# Patient Record
Sex: MALE | Race: WHITE | NOT HISPANIC OR LATINO | Employment: OTHER | URBAN - METROPOLITAN AREA
[De-identification: names, ages, dates, MRNs, and addresses within clinical notes are randomized per-mention and may not be internally consistent; named-entity substitution may affect disease eponyms.]

---

## 2021-03-16 ENCOUNTER — TELEPHONE (OUTPATIENT)
Dept: SURGICAL ONCOLOGY | Facility: CLINIC | Age: 71
End: 2021-03-16

## 2021-03-16 NOTE — TELEPHONE ENCOUNTER
New Patient Encounter    New Patient Intake Form   Patient Details:  Viviana Sanchez  1950  [id number]    Background Information:  [address] starts by opening a telephone encounter and gathering the following information   Who is calling to schedule? If not self, relationship to patient? self   Referring Provider Dr Mirza Kuo patient  [phone]   What is the diagnosis? Melanoma    Is this diagnosis confirmed? Yes   When was the diagnosis? ongoing   Is there a confirmed diagnosis from a biopsy/tissue reviewed by pathology? yes   Were outside slides requested? Yes   Is patient aware of diagnosis? Yes   Is there a personal history and what kind? No   Is there a family history and what kind? No   Reason for visit? New Diagnosis   Have you had any imaging or labs done? If so: when, where? yes  [facility]   Are records in EPIC? No- unable to access records from [facility]    If patient has a prior history of breast cancer were old records obtained? NA   Was the patient told to bring a disk? No   Does the patient smoke or Vape? No   If yes, how many packs or cartridges per day? na   Scheduling Information:   Preferred [place]: Adela Ann     Are there any dates/time the patient cannot be seen? na   Miscellaneous: Records located in Care Everywhere  After completing the above information, please route to Financial Counselor and the appropriate Nurse Navigator for review

## 2021-03-22 DIAGNOSIS — C43.4 MALIGNANT MELANOMA OF SCALP (HCC): Primary | ICD-10-CM

## 2021-03-22 RX ORDER — SODIUM CHLORIDE 9 MG/ML
20 INJECTION, SOLUTION INTRAVENOUS ONCE
Status: CANCELLED | OUTPATIENT
Start: 2021-03-31

## 2021-03-25 ENCOUNTER — TELEPHONE (OUTPATIENT)
Dept: HEMATOLOGY ONCOLOGY | Facility: CLINIC | Age: 71
End: 2021-03-25

## 2021-03-25 NOTE — TELEPHONE ENCOUNTER
Attempted to call patient and introduce myself  Wanting to review our process and expections for treatment next week  VM box not set up and no scanned communication consent  If patient returns my call please forward to me in [facility]

## 2021-03-29 ENCOUNTER — DOCUMENTATION (OUTPATIENT)
Dept: HEMATOLOGY ONCOLOGY | Facility: CLINIC | Age: 71
End: 2021-03-29

## 2021-03-29 DIAGNOSIS — C43.4 MALIGNANT MELANOMA OF SCALP (HCC): Primary | ICD-10-CM

## 2021-03-31 ENCOUNTER — HOSPITAL ENCOUNTER (OUTPATIENT)
Dept: INFUSION CENTER | Facility: CLINIC | Age: 71
End: 2021-03-31

## 2021-03-31 ENCOUNTER — CONSULT (OUTPATIENT)
Dept: HEMATOLOGY ONCOLOGY | Facility: CLINIC | Age: 71
End: 2021-03-31
Payer: MEDICARE

## 2021-03-31 VITALS
RESPIRATION RATE: 18 BRPM | HEART RATE: 77 BPM | HEIGHT: 70 IN | BODY MASS INDEX: 25.48 KG/M2 | SYSTOLIC BLOOD PRESSURE: 140 MMHG | WEIGHT: 178 LBS | OXYGEN SATURATION: 100 % | TEMPERATURE: 96.5 F | DIASTOLIC BLOOD PRESSURE: 82 MMHG

## 2021-03-31 DIAGNOSIS — Z79.899 HIGH RISK MEDICATION USE: ICD-10-CM

## 2021-03-31 DIAGNOSIS — R11.0 NAUSEA: ICD-10-CM

## 2021-03-31 DIAGNOSIS — C43.4 MALIGNANT MELANOMA OF SCALP (HCC): Primary | ICD-10-CM

## 2021-03-31 PROCEDURE — 99215 OFFICE O/P EST HI 40 MIN: CPT | Performed by: INTERNAL MEDICINE

## 2021-03-31 RX ORDER — LAMOTRIGINE 25 MG/1
TABLET ORAL
COMMUNITY
Start: 2021-01-28 | End: 2021-05-06 | Stop reason: ALTCHOICE

## 2021-03-31 RX ORDER — PANTOPRAZOLE SODIUM 40 MG/1
40 TABLET, DELAYED RELEASE ORAL DAILY
COMMUNITY
Start: 2020-10-24

## 2021-03-31 RX ORDER — SUMATRIPTAN 50 MG/1
50 TABLET, FILM COATED ORAL ONCE AS NEEDED
COMMUNITY

## 2021-03-31 RX ORDER — ONDANSETRON 4 MG/1
4 TABLET, FILM COATED ORAL
COMMUNITY
Start: 2021-03-25 | End: 2021-05-06 | Stop reason: SDUPTHER

## 2021-03-31 RX ORDER — SIMVASTATIN 40 MG
40 TABLET ORAL
COMMUNITY

## 2021-03-31 RX ORDER — LEVOTHYROXINE SODIUM 0.05 MG/1
100 TABLET ORAL DAILY
COMMUNITY
Start: 2021-03-03 | End: 2021-08-12

## 2021-03-31 RX ORDER — LAMOTRIGINE 100 MG/1
50 TABLET ORAL
COMMUNITY
Start: 2021-01-28 | End: 2021-08-05 | Stop reason: ALTCHOICE

## 2021-03-31 RX ORDER — GEMFIBROZIL 600 MG/1
600 TABLET, FILM COATED ORAL
COMMUNITY
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
## 2021-04-01 PROBLEM — D86.9 SARCOIDOSIS: Status: ACTIVE | Noted: 2020-07-07

## 2021-04-01 PROBLEM — E03.9 ACQUIRED HYPOTHYROIDISM: Status: ACTIVE | Noted: 2020-07-07

## 2021-04-01 PROBLEM — R56.9 SEIZURES (HCC): Status: ACTIVE | Noted: 2018-02-06
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
## 2021-04-01 NOTE — ASSESSMENT & PLAN NOTE
Mr Jenifer Patricio is a 66 yr male with 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 pembrolizumab here for follow up  He recently received his treatment of pembrolizumab last week on 3/25/2021 at [facility]  He is tolerating treatment well  Labs reviewed and ok  TSH is improving and he is stable on his current dose of levothyroxine  I will not change any dose at this time, but will consider increasing if there are worsening symptoms or TSH increases again  He is due for imaging now  Will get a PET scan  Unable to do brain MRI due to pacemaker  If needbe, can get HCT, depending on PET results  He will return in 5 weeks for clinic visit and infusion  I will also check in with him in three weeks to see how he is doing  Scripts provided for blood work  I will request ECHO report to evaluate EF and ensure no changes and that this is not contributing to unwell and fatigue feeling  He knows to call if there are any issues or concerns prior to his next visit

## 2021-04-09 LAB
ALBUMIN SERPL-MCNC: 3.8 G/DL (ref 3.7–4.7)
ALBUMIN/GLOB SERPL: 1.1 {RATIO} (ref 1.2–2.2)
ALP SERPL-CCNC: 108 IU/L (ref 39–117)
ALT SERPL-CCNC: 12 IU/L (ref 0–44)
AST SERPL-CCNC: 16 IU/L (ref 0–40)
BASOPHILS # BLD AUTO: 0 X10E3/UL (ref 0–0.2)
BASOPHILS NFR BLD AUTO: 1 %
BILIRUB SERPL-MCNC: 0.4 MG/DL (ref 0–1.2)
BUN SERPL-MCNC: 15 MG/DL (ref 8–27)
BUN/CREAT SERPL: 14 (ref 10–24)
CALCIUM SERPL-MCNC: 9 MG/DL (ref 8.6–10.2)
CHLORIDE SERPL-SCNC: 100 MMOL/L (ref 96–106)
CO2 SERPL-SCNC: 23 MMOL/L (ref 20–29)
CREAT SERPL-MCNC: 1.08 MG/DL (ref 0.76–1.27)
EOSINOPHIL # BLD AUTO: 0 X10E3/UL (ref 0–0.4)
EOSINOPHIL NFR BLD AUTO: 0 %
ERYTHROCYTE [DISTWIDTH] IN BLOOD BY AUTOMATED COUNT: 14.6 % (ref 11.6–15.4)
GLOBULIN SER-MCNC: 3.4 G/DL (ref 1.5–4.5)
GLUCOSE SERPL-MCNC: 100 MG/DL (ref 65–99)
HCT VFR BLD AUTO: 39.3 % (ref 37.5–51)
HGB BLD-MCNC: 13.1 G/DL (ref 13–17.7)
IMM GRANULOCYTES # BLD: 0 X10E3/UL (ref 0–0.1)
IMM GRANULOCYTES NFR BLD: 0 %
LDH SERPL-CCNC: 172 IU/L (ref 121–224)
LYMPHOCYTES # BLD AUTO: 0.6 X10E3/UL (ref 0.7–3.1)
LYMPHOCYTES NFR BLD AUTO: 11 %
MCH RBC QN AUTO: 28.4 PG (ref 26.6–33)
MCHC RBC AUTO-ENTMCNC: 33.3 G/DL (ref 31.5–35.7)
MCV RBC AUTO: 85 FL (ref 79–97)
MONOCYTES # BLD AUTO: 0.8 X10E3/UL (ref 0.1–0.9)
MONOCYTES NFR BLD AUTO: 16 %
NEUTROPHILS # BLD AUTO: 3.6 X10E3/UL (ref 1.4–7)
NEUTROPHILS NFR BLD AUTO: 72 %
PLATELET # BLD AUTO: 205 X10E3/UL (ref 150–450)
POTASSIUM SERPL-SCNC: 4.3 MMOL/L (ref 3.5–5.2)
PROT SERPL-MCNC: 7.2 G/DL (ref 6–8.5)
RBC # BLD AUTO: 4.62 X10E6/UL (ref 4.14–5.8)
SL AMB EGFR AFRICAN AMERICAN: 78 ML/MIN/1.73
SL AMB EGFR NON AFRICAN AMERICAN: 68 ML/MIN/1.73
SL AMB T4, FREE (DIRECT): 1.31 NG/DL (ref 0.82–1.77)
SODIUM SERPL-SCNC: 136 MMOL/L (ref 134–144)
T3FREE SERPL-MCNC: 2.4 PG/ML (ref 2–4.4)
TSH SERPL DL<=0.005 MIU/L-ACNC: 7.58 UIU/ML (ref 0.45–4.5)
WBC # BLD AUTO: 5 X10E3/UL (ref 3.4–10.8)

## 2021-04-13 ENCOUNTER — HOSPITAL ENCOUNTER (OUTPATIENT)
Dept: RADIOLOGY | Age: 71
Discharge: HOME | End: 2021-04-13
Payer: MEDICARE

## 2021-04-13 DIAGNOSIS — C43.4 MALIGNANT MELANOMA OF SCALP (HCC): ICD-10-CM

## 2021-04-13 LAB — GLUCOSE SERPL-MCNC: 104 MG/DL (ref 65–140)

## 2021-04-13 PROCEDURE — A9552 F18 FDG: HCPCS

## 2021-04-13 PROCEDURE — G1004 CDSM NDSC: HCPCS

## 2021-04-13 PROCEDURE — 82948 REAGENT STRIP/BLOOD GLUCOSE: CPT

## 2021-04-13 PROCEDURE — 78816 PET IMAGE W/CT FULL BODY: CPT

## 2021-04-16 DIAGNOSIS — C43.4 MALIGNANT MELANOMA OF SCALP (HCC): Primary | ICD-10-CM

## 2021-04-16 NOTE — PROGRESS NOTES
PET CT reviewed after request for lymph node biopsy received  There are multiple mediastinal lymph nodes which can be biopsies either via bronchoscopy or endoscopy  The easiest to reach are probably the subcarinal nodes which can be sampled from with either technique  However, there are no lymph nodes that can be safely sampled percutaneously

## 2021-04-29 DIAGNOSIS — C43.4 MALIGNANT MELANOMA OF SCALP (HCC): Primary | ICD-10-CM

## 2021-04-29 LAB
ALBUMIN SERPL-MCNC: 4.2 G/DL (ref 3.7–4.7)
ALBUMIN/GLOB SERPL: 1.1 {RATIO} (ref 1.2–2.2)
ALP SERPL-CCNC: 118 IU/L (ref 39–117)
ALT SERPL-CCNC: 13 IU/L (ref 0–44)
AST SERPL-CCNC: 17 IU/L (ref 0–40)
BASOPHILS # BLD AUTO: 0.1 X10E3/UL (ref 0–0.2)
BASOPHILS NFR BLD AUTO: 1 %
BILIRUB SERPL-MCNC: 0.5 MG/DL (ref 0–1.2)
BUN SERPL-MCNC: 19 MG/DL (ref 8–27)
BUN/CREAT SERPL: 16 (ref 10–24)
CALCIUM SERPL-MCNC: 9.6 MG/DL (ref 8.6–10.2)
CHLORIDE SERPL-SCNC: 97 MMOL/L (ref 96–106)
CO2 SERPL-SCNC: 23 MMOL/L (ref 20–29)
CREAT SERPL-MCNC: 1.22 MG/DL (ref 0.76–1.27)
EOSINOPHIL # BLD AUTO: 0.1 X10E3/UL (ref 0–0.4)
EOSINOPHIL NFR BLD AUTO: 2 %
ERYTHROCYTE [DISTWIDTH] IN BLOOD BY AUTOMATED COUNT: 14.2 % (ref 11.6–15.4)
GLOBULIN SER-MCNC: 3.8 G/DL (ref 1.5–4.5)
GLUCOSE SERPL-MCNC: 104 MG/DL (ref 65–99)
HCT VFR BLD AUTO: 42.5 % (ref 37.5–51)
HGB BLD-MCNC: 14.4 G/DL (ref 13–17.7)
IMM GRANULOCYTES # BLD: 0 X10E3/UL (ref 0–0.1)
IMM GRANULOCYTES NFR BLD: 0 %
LDH SERPL-CCNC: 141 IU/L (ref 121–224)
LYMPHOCYTES # BLD AUTO: 0.8 X10E3/UL (ref 0.7–3.1)
LYMPHOCYTES NFR BLD AUTO: 14 %
MCH RBC QN AUTO: 28.7 PG (ref 26.6–33)
MCHC RBC AUTO-ENTMCNC: 33.9 G/DL (ref 31.5–35.7)
MCV RBC AUTO: 85 FL (ref 79–97)
MONOCYTES # BLD AUTO: 1 X10E3/UL (ref 0.1–0.9)
MONOCYTES NFR BLD AUTO: 18 %
NEUTROPHILS # BLD AUTO: 3.7 X10E3/UL (ref 1.4–7)
NEUTROPHILS NFR BLD AUTO: 65 %
PLATELET # BLD AUTO: 251 X10E3/UL (ref 150–450)
POTASSIUM SERPL-SCNC: 4.6 MMOL/L (ref 3.5–5.2)
PROT SERPL-MCNC: 8 G/DL (ref 6–8.5)
RBC # BLD AUTO: 5.01 X10E6/UL (ref 4.14–5.8)
SL AMB EGFR AFRICAN AMERICAN: 68 ML/MIN/1.73
SL AMB EGFR NON AFRICAN AMERICAN: 58 ML/MIN/1.73
SL AMB T4, FREE (DIRECT): 1.08 NG/DL (ref 0.82–1.77)
SODIUM SERPL-SCNC: 132 MMOL/L (ref 134–144)
T3FREE SERPL-MCNC: 2.1 PG/ML (ref 2–4.4)
TSH SERPL DL<=0.005 MIU/L-ACNC: 11.3 UIU/ML (ref 0.45–4.5)
WBC # BLD AUTO: 5.7 X10E3/UL (ref 3.4–10.8)

## 2021-04-29 RX ORDER — SODIUM CHLORIDE 9 MG/ML
20 INJECTION, SOLUTION INTRAVENOUS ONCE
Status: CANCELLED | OUTPATIENT
Start: 2021-05-06

## 2021-05-06 ENCOUNTER — HOSPITAL ENCOUNTER (OUTPATIENT)
Dept: INFUSION CENTER | Facility: CLINIC | Age: 71
Discharge: HOME | End: 2021-05-06
Payer: MEDICARE

## 2021-05-06 ENCOUNTER — OFFICE VISIT (OUTPATIENT)
Dept: HEMATOLOGY ONCOLOGY | Facility: CLINIC | Age: 71
End: 2021-05-06
Payer: MEDICARE

## 2021-05-06 VITALS
DIASTOLIC BLOOD PRESSURE: 64 MMHG | TEMPERATURE: 97.8 F | HEART RATE: 76 BPM | SYSTOLIC BLOOD PRESSURE: 118 MMHG | OXYGEN SATURATION: 98 % | RESPIRATION RATE: 14 BRPM | WEIGHT: 179 LBS | HEIGHT: 70 IN | BODY MASS INDEX: 25.62 KG/M2

## 2021-05-06 VITALS
OXYGEN SATURATION: 98 % | TEMPERATURE: 97.3 F | SYSTOLIC BLOOD PRESSURE: 112 MMHG | RESPIRATION RATE: 16 BRPM | WEIGHT: 168 LBS | HEIGHT: 69 IN | HEART RATE: 72 BPM | DIASTOLIC BLOOD PRESSURE: 70 MMHG | BODY MASS INDEX: 24.88 KG/M2

## 2021-05-06 DIAGNOSIS — Z79.899 HIGH RISK MEDICATION USE: ICD-10-CM

## 2021-05-06 DIAGNOSIS — R11.0 NAUSEA: ICD-10-CM

## 2021-05-06 DIAGNOSIS — C43.4 MALIGNANT MELANOMA OF SCALP (HCC): Primary | ICD-10-CM

## 2021-05-06 DIAGNOSIS — E03.9 ACQUIRED HYPOTHYROIDISM: ICD-10-CM

## 2021-05-06 PROCEDURE — 99214 OFFICE O/P EST MOD 30 MIN: CPT | Performed by: INTERNAL MEDICINE

## 2021-05-06 PROCEDURE — 96413 CHEMO IV INFUSION 1 HR: CPT

## 2021-05-06 RX ORDER — LEVOTHYROXINE SODIUM 0.12 MG/1
125 TABLET ORAL DAILY
Qty: 30 TABLET | Refills: 5 | Status: CANCELLED | OUTPATIENT
Start: 2021-05-06

## 2021-05-06 RX ORDER — SODIUM CHLORIDE 9 MG/ML
20 INJECTION, SOLUTION INTRAVENOUS ONCE
Status: COMPLETED | OUTPATIENT
Start: 2021-05-06 | End: 2021-05-06

## 2021-05-06 RX ORDER — ONDANSETRON HYDROCHLORIDE 8 MG/1
4 TABLET, FILM COATED ORAL EVERY 8 HOURS PRN
Qty: 20 TABLET | Refills: 1 | OUTPATIENT
Start: 2021-05-06

## 2021-05-06 RX ORDER — ONDANSETRON 4 MG/1
4 TABLET, FILM COATED ORAL EVERY 8 HOURS PRN
Qty: 20 TABLET | Refills: 5 | Status: CANCELLED | OUTPATIENT
Start: 2021-05-06 | End: 2021-05-21

## 2021-05-06 RX ORDER — ONDANSETRON 4 MG/1
4 TABLET, FILM COATED ORAL EVERY 8 HOURS PRN
Qty: 20 TABLET | Refills: 5 | Status: SHIPPED | OUTPATIENT
Start: 2021-05-06 | End: 2021-07-15 | Stop reason: SDUPTHER

## 2021-05-06 RX ORDER — BETAMETHASONE DIPROPIONATE 0.5 MG/G
CREAM TOPICAL 2 TIMES DAILY
COMMUNITY

## 2021-05-06 RX ORDER — LEVOTHYROXINE SODIUM 0.12 MG/1
125 TABLET ORAL DAILY
Qty: 30 TABLET | Refills: 5 | Status: SHIPPED | OUTPATIENT
Start: 2021-05-06

## 2021-05-06 RX ORDER — BENZONATATE 100 MG/1
100 CAPSULE ORAL 3 TIMES DAILY PRN
COMMUNITY

## 2021-05-06 RX ADMIN — SODIUM CHLORIDE 20 ML/HR: 0.9 INJECTION, SOLUTION INTRAVENOUS at 12:55

## 2021-05-06 RX ADMIN — SODIUM CHLORIDE 400 MG: 9 INJECTION, SOLUTION INTRAVENOUS at 13:20

## 2021-05-06 NOTE — ASSESSMENT & PLAN NOTE
Mr Jeannette Monet is a 71yr male with 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 treatment with pembrolizumab here for follow up and treatment  He is doing well  He has a chronic dry cough that he is using Tessalon perles for, as prescribed by PCP  O2 stats ok and no SOB  Will continue to monitor, and can get Chest CT if increased concern for pneumonitis  Has some localized skin itching on upper legs/thighs that he is using topical steroids, as hydroxyzine makes him too sleepy and this is ok to continue  Healing scab on scalp from bumping it one week ago  Monitor healing, and if appears to not heal, recommend biopsy, as this is site of primary and recurrent melanoma  Labs reviewed and ok to treat  Cycle #4 pembrolizumab today  Due for repeat scan at end of June  Will give him script at his next visit  He will return in 6 weeks with labs (scripts provided) for his next treatment  He knows to call with any issues or concerns prior to his next visit

## 2021-05-06 NOTE — LETTER
May 6, 2021     Lyn Coburn MD  [address] [facility]  [address] [facility]    Patient: Alison Cormier   YOB: 1950   Date of Visit: 5/6/2021       Dear Dr Susana Oconnor: Thank you for referring Ariana Espino to me for evaluation  Below are my notes for this consultation  If you have questions, please do not hesitate to call me  I look forward to following your patient along with you  Sincerely,        Liza White MD        CC: MD Bryson Sampson MD Nikki Asp, MD  5/6/2021  5:23 PM  Sign when Signing Visit  [address] [place], [place] [address]  [phone]     Date of Visit: 5/6/2021  Name: Alison Cormier   YOB: 1950     Subjective        VISIT DIAGNOSIS:  Diagnoses and all orders for this visit:    Malignant melanoma of scalp ([facility] )    Acquired hypothyroidism    High risk medication use    Other orders  -     benzonatate (TESSALON PERLES) 100 mg capsule; Take 100 mg by mouth 3 (three) times a day as needed for cough  -     betamethasone dipropionate (DIPROSONE) 0 05 % cream; Apply topically 2 (two) times a day        Oncology History   Malignant melanoma of scalp ([facility] )   5/15/2020 Initial Diagnosis    Malignant melanoma of scalp ([facility] )     6/15/2020 -  Cancer Staged    Staging form: Melanoma of the Skin, AJCC 8th Edition  - Clinical stage from 6/15/2020: Stage IIB (cT3b, cN0, cM0) - Signed by Liza White MD on 3/22/2021       10/26/2020 -  Cancer Staged    Staging form: Melanoma of the Skin, AJCC 8th Edition  - Pathologic stage from 10/26/2020: Stage IIIC (pT3b, pN1c, cM0) - Signed by Liza White MD on 3/22/2021       11/2/2020 - 11/2/2020 Radiation    Performed at location closer to home    Radiation to scalp         12/28/2020 -  Chemotherapy    pembrolizumab (KEYTRUDA) 400 mg in sodium chloride 0 9 % 50 mL IVPB, 400 mg (200 % of original dose 200 mg), Intravenous, Once, 4 of 9 cycles  Dose modification: 400 mg (original dose 200 mg, Cycle 1, Reason: Other (See Comments), Comment: new 6 week dosing)  Administration: 400 mg (5/6/2021)        Cancer Staging  Malignant melanoma of scalp ([facility] [address] )  Staging form: Melanoma of the Skin, AJCC 8th Edition  - Clinical stage from 6/15/2020: Stage IIB (cT3b, cN0, cM0) - Signed by Ernie Holden MD on 3/22/2021  - Pathologic stage from 10/26/2020: Stage IIIC (pT3b, pN1c, cM0) - Signed by Ernie Holden MD on 3/22/2021     Treatment Details   Treatment goal Curative   Plan Name OP Pembrolizumab Q 42 Days   Status Active   Start Date 12/28/2020   End Date 12/2/2021 (Planned)   Provider Ernie Holden MD   Chemotherapy pembrolizumab [facility]) 400 mg in sodium chloride 0 9 % 50 mL IVPB, 400 mg (200 % of original dose 200 mg), Intravenous, Once, 4 of 9 cycles  Dose modification: 400 mg (original dose 200 mg, Cycle 1, Reason: Other (See Comments), Comment: new 6 week dosing)  Administration: 400 mg (5/6/2021)          HISTORY OF PRESENT ILLNESS: Ameya Toledo is a 66 y o  male  who has Stage IIIC melanoma and is receiving adjuvant pembrolizumab is seen today for follow up and treatment with pembrolizumab  He is dong ok and feels well  After his last visit, he has a PET scan for surveillance and results demonstrated increase in size and FDG activity of multiple LNs, diffusely through his body  He had received his  COVID vaccine a few days prior to imaging  We will re-image him approximately 8 weeks from that scan to ensure it was due to [place] and not disease progression, but these results are consistent with generalized immune response/activation, known to be caused by the [place] vaccination  We had discussed this over the phone once I had obtained the results  He is feeling a bit tired, but thinks it is due to his lamictal, as it is causing a number of side effects for him     He has just started to taper the medication  He is having a continued dry cough, that is minimal, though he has episodes where he coughs a lot  Not productive  He denies SOB  Nothing improves his cough and nothing makes it worse specifically  His PCP prescribed tessalon perles  He has had some itching and intermittent rash on legs/upper thighs that I had previously prescribed him hydroxyzine  However, this makes him very tired, so he is using topical steroids which he can continue  He also states that he bumped the top of his head a week ago, and now has a scab in the area  No pain, bleeding or itching of the lesion  REVIEW OF SYSTEMS:  Review of Systems   Constitutional: Positive for appetite change and fatigue  Negative for fever and unexpected weight change  HENT:   Negative for lump/mass  Eyes: Negative for icterus  Respiratory: Positive for cough  Negative for shortness of breath and wheezing  Cardiovascular: Negative for leg swelling  Gastrointestinal: Positive for constipation  Negative for abdominal pain, diarrhea, nausea and vomiting  Genitourinary: Negative for difficulty urinating and hematuria  Musculoskeletal: Negative for arthralgias, gait problem and myalgias  Skin: Positive for itching  Negative for rash  No new, changing, or concerning lesions  Neurological: Negative for extremity weakness, gait problem, headaches, light-headedness and numbness  Hematological: Negative for adenopathy          MEDICATIONS:    Current Outpatient Medications:     Aspirin Buf,CaCarb-MgCarb-MgO, 81 MG TABS, Take 81 mg by mouth, Disp: , Rfl:     benzonatate (TESSALON PERLES) 100 mg capsule, Take 100 mg by mouth 3 (three) times a day as needed for cough, Disp: , Rfl:     betamethasone dipropionate (DIPROSONE) 0 05 % cream, Apply topically 2 (two) times a day, Disp: , Rfl:     gemfibrozil (LOPID) 600 mg tablet, Take 600 mg by mouth, Disp: , Rfl:     lamoTRIgine (LaMICtal) 100 mg tablet, Take 50 mg by mouth Currently tapering off of this medication  , Disp: , Rfl:     levothyroxine 50 mcg tablet, Take 100 mcg by mouth daily, Disp: , Rfl:     pantoprazole (PROTONIX) 40 mg tablet, Take 40 mg by mouth daily, Disp: , Rfl:     simvastatin (ZOCOR) 40 mg tablet, Take 40 mg by mouth, Disp: , Rfl:     SUMAtriptan (IMITREX) 50 mg tablet, Take 50 mg by mouth, Disp: , Rfl:     levothyroxine 125 mcg tablet, Take 1 tablet (125 mcg total) by mouth daily, Disp: 30 tablet, Rfl: 5    ondansetron (ZOFRAN) 4 mg tablet, Take 1 tablet (4 mg total) by mouth every 8 (eight) hours as needed for nausea or vomiting for up to 15 days, Disp: 20 tablet, Rfl: 5  No current facility-administered medications for this visit        ALLERGIES:  Allergies   Allergen Reactions    Chocolate - Food Allergy Other (See Comments)    Iodinated Diagnostic Agents Hives     CT contrast dye      Penicillins Hives    Sulfa Antibiotics Hives    Banana - Food Allergy Headache        ACTIVE PROBLEMS:  Patient Active Problem List   Diagnosis    [facility]    Acquired hypothyroidism    Coronary arteriosclerosis    RBBB    Sarcoidosis    Seizures ([facility] )          PAST MEDICAL HISTORY:   Past Medical History:   Diagnosis Date    Migraines     [facility] cancer ([facility] )     Sarcoidosis     Skin cancer         PAST SURGICAL HISTORY:  Past Surgical History:   Procedure Laterality Date    HERNIA REPAIR      PROSTATE SURGERY          SOCIAL HISTORY:  Social History     Socioeconomic History    Marital status: /Civil Union     Spouse name: None    Number of children: None    Years of education: None    Highest education level: None   Occupational History    None   Social Needs    Financial resource strain: None    Food insecurity     Worry: None     Inability: None    Transportation needs     Medical: None     Non-medical: None   Tobacco Use    Smoking status: Former Smoker    Smokeless tobacco: Never Used Substance and Sexual Activity    Alcohol use: Not Currently    Drug use: Never    Sexual activity: None   Lifestyle    Physical activity     Days per week: None     Minutes per session: None    Stress: None   Relationships    Social connections     Talks on phone: None     Gets together: None     Attends Sabianism service: None     Active member of club or organization: None     Attends meetings of clubs or organizations: None     Relationship status: None    Intimate partner violence     Fear of current or ex partner: None     Emotionally abused: None     Physically abused: None     Forced sexual activity: None   Other Topics Concern    None   Social History Narrative    None        FAMILY HISTORY:  History reviewed  No pertinent family history  Objective        PHYSICAL EXAMINATION:   Blood pressure 118/64, pulse 76, temperature 97 8 °F (36 6 °C), temperature source Temporal, resp  rate 14, height 5' 10" (1 778 m), weight 81 2 kg (179 lb), SpO2 98 %  Pain Score: 0-No pain     ECOG Performance Status      Most Recent Value   ECOG Performance Status  Restricted in physically strenuous activity but ambulatory and able to carry out work of a light or sedentary nature, e g , light house work, office work        ECOG PS 1       Physical Exam  Constitutional:       General: He is not in acute distress  Appearance: Normal appearance  He is not toxic-appearing  HENT:      Mouth/Throat:      Mouth: Mucous membranes are moist       Pharynx: Oropharynx is clear  Eyes:      General: No scleral icterus  Cardiovascular:      Rate and Rhythm: Normal rate and regular rhythm  Pulses: Normal pulses  Heart sounds: No murmur  No friction rub  No gallop  Pulmonary:      Effort: Pulmonary effort is normal  No respiratory distress  Breath sounds: Normal breath sounds  No wheezing or rales  Abdominal:      General: There is no distension  Palpations: There is no mass        Tenderness: There is no abdominal tenderness  There is no rebound  Musculoskeletal:         General: No swelling or tenderness  Right lower leg: No edema  Left lower leg: No edema  Lymphadenopathy:      Head:      Right side of head: No submandibular, preauricular or posterior auricular adenopathy  Left side of head: No submandibular, preauricular or posterior auricular adenopathy  Cervical: No cervical adenopathy  Right cervical: No superficial or posterior cervical adenopathy  Left cervical: No superficial or posterior cervical adenopathy  Upper Body:      Right upper body: No supraclavicular or axillary adenopathy  Left upper body: No supraclavicular or axillary adenopathy  Lower Body: No right inguinal adenopathy  No left inguinal adenopathy  Skin:     Findings: Lesion (scab on top of head) present  No rash  Comments: Well healed surgical scar  No evidence of recurrence at primary site  Healing scab in the anterior field  No associated masses or nodules and no lesions concerning for melanoma recurrence  Neurological:      General: No focal deficit present  Mental Status: He is alert and oriented to person, place, and time  Psychiatric:         Mood and Affect: Mood normal          Behavior: Behavior normal          Thought Content: Thought content normal          Judgment: Judgment normal          I reviewed lab data in the chart      White Blood Cell Count   Date Value Ref Range Status   04/28/2021 5 7 3 4 - 10 8 x10E3/uL Final   04/08/2021 5 0 3 4 - 10 8 x10E3/uL Final     Hemoglobin   Date Value Ref Range Status   04/28/2021 14 4 13 0 - 17 7 g/dL Final   04/08/2021 13 1 13 0 - 17 7 g/dL Final     Platelet Count   Date Value Ref Range Status   04/28/2021 251 150 - 450 x10E3/uL Final   04/08/2021 205 150 - 450 x10E3/uL Final     MCV   Date Value Ref Range Status   04/28/2021 85 79 - 97 fL Final   04/08/2021 85 79 - 97 fL Final      Potassium   Date Value Ref Range Status   04/28/2021 4 6 3 5 - 5 2 mmol/L Final   04/08/2021 4 3 3 5 - 5 2 mmol/L Final     Chloride   Date Value Ref Range Status   04/28/2021 97 96 - 106 mmol/L Final   04/08/2021 100 96 - 106 mmol/L Final     CO2   Date Value Ref Range Status   04/28/2021 23 20 - 29 mmol/L Final   04/08/2021 23 20 - 29 mmol/L Final     BUN   Date Value Ref Range Status   04/28/2021 19 8 - 27 mg/dL Final   04/08/2021 15 8 - 27 mg/dL Final     Creatinine   Date Value Ref Range Status   04/28/2021 1 22 0 76 - 1 27 mg/dL Final   04/08/2021 1 08 0 76 - 1 27 mg/dL Final     Glucose, Random   Date Value Ref Range Status   04/28/2021 104 (H) 65 - 99 mg/dL Final   04/08/2021 100 (H) 65 - 99 mg/dL Final     eGFR    Date Value Ref Range Status   04/28/2021 68 >59 mL/min/1 73 Final     Comment:     **[facility] currently reports eGFR in compliance with the current**    recommendations of the [place] will  update   reporting as new guidelines are published from the NKF-ASN  Task force       04/08/2021 78 >59 mL/min/1 73 Final     Albumin   Date Value Ref Range Status   04/28/2021 4 2 3 7 - 4 7 g/dL Final   04/08/2021 3 8 3 7 - 4 7 g/dL Final     TOTAL BILIRUBIN   Date Value Ref Range Status   04/28/2021 0 5 0 0 - 1 2 mg/dL Final   04/08/2021 0 4 0 0 - 1 2 mg/dL Final     AST   Date Value Ref Range Status   04/28/2021 17 0 - 40 IU/L Final   04/08/2021 16 0 - 40 IU/L Final     ALT   Date Value Ref Range Status   04/28/2021 13 0 - 44 IU/L Final   04/08/2021 12 0 - 44 IU/L Final      LDH   Date Value Ref Range Status   04/28/2021 141 121 - 224 IU/L Final   04/08/2021 172 121 - 224 IU/L Final     TSH   Date Value Ref Range Status   04/28/2021 11 300 (H) 0 450 - 4 500 uIU/mL Final   04/08/2021 7 580 (H) 0 450 - 4 500 uIU/mL Final     No results found for: Z1QLCJK   No results found for: FREET4      RECENT IMAGING:  Procedure: Nm Pet Ct Tumor Imaging Whole Body    Result Date: 4/13/2021  Narrative: WHOLE-BODY PET/CT SCAN INDICATION: Restaging of malignant melanoma of the scalp  Scalp excision 7/9/2020  Additional history of prostate cancer in 2006  C43 4: Malignant melanoma of scalp and neck MODIFIER: PS COMPARISON: Outside PET/CT 11/9/2020 CELL TYPE:  Ulcerated nodular malignant melanoma, left posterior scalp 6/15/2020 TECHNIQUE:   8 3 mCi F-18-FD administered IV  Multiplanar attenuation corrected and non attenuation corrected PET images were acquired 60 minutes post injection  Contiguous, low dose, axial CT sections were obtained from the skull vertex through the feet  Intravenous contrast material was not utilized  This examination, like all CT scans performed in the [facility], was performed utilizing techniques to minimize radiation dose exposure, including the use of iterative reconstruction  and automated exposure control  Fasting serum glucose: 104 mg/dl FINDINGS: VISUALIZED BRAIN:   No acute abnormalities are seen  HEAD/NECK:   No suspicious FDG uptake at the scalp  Diffuse radiotracer uptake at the thyroid gland, SUV max of 5 7  New small FDG avid lymph nodes in the right lower neck identified  A right supraclavicular lymph node demonstrates a SUV max of 1 8  A thin lymph node measures up to 8 mm in size image 85 series 2  A right subclavicular lymph node demonstrates a SUV max of 2 2  This measures 1 cm in size image 91 series 2  CT images: No additional significant findings  CHEST:   Multiple new FDG avid lymph nodes in the mediastinal and bilateral perihilar regions  A right perihilar focus demonstrates SUV max of 12 3  A left perihilar focus demonstrates SUV max of 6 5  Subcarinal lymph node demonstrates SUV max of 24 6  This measures up to 1 4 cm short axis image 113 series 2, new from the prior exam   Right precarinal lymph node demonstrates SUV max of 6 6  This measures 1 cm in size image 105 series 2 also new   Nodular interstitial changes and fibrosis in the lung fields bilaterally with patchy FDG uptake most extensive in the lower lobes, SUV max of 2 3 in the right lower lobe and 2 1 in the left lower lobe  CT findings are similar to the prior outside exam  CT images: Moderate coronary artery calcifications  Small hiatal hernia  ABDOMEN:   Scattered new FDG avid lymph nodes in the gastrohepatic region and aleksandar hepatis  A gastrohepatic lymph node demonstrates SUV max of 6 4  This measures 1 9 x 0 7 cm image 145 series 2  Portacaval lymph node demonstrates SUV max of 8 2  This measures 1 9 x 1 0 cm image 147 series 2  CT images: No additional significant findings  PELVIS: No FDG avid soft tissue lesions are seen  CT images: Status post prostatectomy OSSEOUS STRUCTURES/EXTREMITIES: No FDG avid lesions are seen  CT images: No significant findings  Impression: 1  Multiple new FDG avid lymph nodes in the lower neck, chest and abdomen for which metastasis should be excluded  Patient is noted to have a history of sarcoidosis which could also explain these findings along with the pulmonary nodular interstitial changes  Tissue sampling may be warranted for definitive confirmation  2  Diffuse thyroid gland activity, could be related to thyroiditis  Workstation performed: [id number]          Assessment    Assessment/Plan  Mr Dwaine Francois is a 66 yr male with 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 treatment with pembrolizumab here for follow up and treatment  Acquired hypothyroidism  Mr Mike Garza TSH continues to rise  He does state he is more tired,but thinks this is due to lamictal, which he just began to titrate  However, his TSH was elevated at his last visit and continues to increase  We will increase his levothyroxine to 125 mcg  He knows the signs and symptoms to look for regarding hyperthyroidism  We will recheck labs in 6 weeks prior to his next treatment, as it takes some time for the body to respond to changes in levothyroxine dosing    He knows to call if there are issues or concerns  Malignant melanoma of scalp [facility])  Mr Sara Mittal is a 71yr male with 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 treatment with pembrolizumab here for follow up and treatment  He is doing well  He has a chronic dry cough that he is using Tessalon perles for, as prescribed by PCP  O2 stats ok and no SOB  Will continue to monitor, and can get Chest CT if increased concern for pneumonitis  Has some localized skin itching on upper legs/thighs that he is using topical steroids, as hydroxyzine makes him too sleepy and this is ok to continue  Healing scab on scalp from bumping it one week ago  Monitor healing, and if appears to not heal, recommend biopsy, as this is site of primary and recurrent melanoma  Labs reviewed and ok to treat  Cycle #4 pembrolizumab today  Due for repeat scan at end of June  Will give him script at his next visit  He will return in 6 weeks with labs (scripts provided) for his next treatment  He knows to call with any issues or concerns prior to his next visit  Return in about 6 months (around 11/6/2021), or office visit and infusion visit       Jarred Mc MD, PhD

## 2021-05-06 NOTE — ASSESSMENT & PLAN NOTE
Mr Jumana Kelley TSH continues to rise  He does state he is more tired,but thinks this is due to lamictal, which he just began to titrate  However, his TSH was elevated at his last visit and continues to increase  We will increase his levothyroxine to 125 mcg  He knows the signs and symptoms to look for regarding hyperthyroidism  We will recheck labs in 6 weeks prior to his next treatment, as it takes some time for the body to respond to changes in levothyroxine dosing  He knows to call if there are issues or concerns

## 2021-05-06 NOTE — PROGRESS NOTES
[facility]ATOLOGY ONCOLOGY SPECIAL[facility] [place]  [address] [facility] [id number] [phone]     Date of Visit: 5/6/2021  Name: Sammi Victor   YOB: 1950     Subjective        VISIT DIAGNOSIS:  Diagnoses and all orders for this visit:    Malignant melanoma of scalp ([address] )    Acquired hypothyroidism    High risk medication use    Other orders  -     benzonatate (TESSALON PERLES) 100 mg capsule; Take 100 mg by mouth 3 (three) times a day as needed for cough  -     betamethasone dipropionate (DIPROSONE) 0 05 % cream; Apply topically 2 (two) times a day        Oncology History   Malignant melanoma of scalp ([address] )   5/15/2020 Initial Diagnosis    Malignant melanoma of scalp ([facility] )     6/15/2020 -  Cancer Staged    Staging form: Melanoma of the Skin, AJCC 8th Edition  - Clinical stage from 6/15/2020: Stage IIB (cT3b, cN0, cM0) - Signed by Yady Forde MD on 3/22/2021       10/26/2020 -  Cancer Staged    Staging form: Melanoma of the Skin, AJCC 8th Edition  - Pathologic stage from 10/26/2020: Stage IIIC (pT3b, pN1c, cM0) - Signed by Yady Forde MD on 3/22/2021       11/2/2020 - 11/2/2020 Radiation    Performed at location closer to home    Radiation to scalp         12/28/2020 -  Chemotherapy    pembrolizumab (KEYTRUDA) 400 mg in sodium chloride 0 9 % 50 mL IVPB, 400 mg (200 % of original dose 200 mg), Intravenous, Once, 4 of 9 cycles  Dose modification: 400 mg (original dose 200 mg, Cycle 1, Reason: Other (See Comments), Comment: new 6 week dosing)  Administration: 400 mg (5/6/2021)        Cancer Staging  Malignant melanoma of scalp ([facility] )  Staging form: Melanoma of the Skin, AJCC 8th Edition  - Clinical stage from 6/15/2020: Stage IIB (cT3b, cN0, cM0) - Signed by Yady Forde MD on 3/22/2021  - Pathologic stage from 10/26/2020: Stage IIIC (pT3b, pN1c, cM0) - Signed by Yady Forde MD on 3/22/2021     Treatment Details   Treatment goal Curative   Plan Name OP Pembrolizumab Q 42 Days   Status Active   Start Date 12/28/2020   End Date 12/2/2021 (Planned)   Provider Romana Aldridge MD   Chemotherapy pembrolizumab [facility]) 400 mg in sodium chloride 0 9 % 50 mL IVPB, 400 mg (200 % of original dose 200 mg), Intravenous, Once, 4 of 9 cycles  Dose modification: 400 mg (original dose 200 mg, Cycle 1, Reason: Other (See Comments), Comment: new 6 week dosing)  Administration: 400 mg (5/6/2021)          HISTORY OF PRESENT ILLNESS: Pia Wade is a 66 y o  male  who has Stage IIIC melanoma and is receiving adjuvant pembrolizumab is seen today for follow up and treatment with pembrolizumab  He is dong ok and feels well  After his last visit, he has a PET scan for surveillance and results demonstrated increase in size and FDG activity of multiple LNs, diffusely through his body  He had received his  COVID vaccine a few days prior to imaging  We will re-image him approximately 8 weeks from that scan to ensure it was due to [place] and not disease progression, but these results are consistent with generalized immune response/activation, known to be caused by the [place] vaccination  We had discussed this over the phone once I had obtained the results  He is feeling a bit tired, but thinks it is due to his lamictal, as it is causing a number of side effects for him  He has just started to taper the medication  He is having a continued dry cough, that is minimal, though he has episodes where he coughs a lot  Not productive  He denies SOB  Nothing improves his cough and nothing makes it worse specifically  His PCP prescribed tessalon perles  He has had some itching and intermittent rash on legs/upper thighs that I had previously prescribed him hydroxyzine  However, this makes him very tired, so he is using topical steroids which he can continue  He also states that he bumped the top of his head a week ago, and now has a scab in the area    No pain, bleeding or itching of the lesion  REVIEW OF SYSTEMS:  Review of Systems   Constitutional: Positive for appetite change and fatigue  Negative for fever and unexpected weight change  HENT:   Negative for lump/mass  Eyes: Negative for icterus  Respiratory: Positive for cough  Negative for shortness of breath and wheezing  Cardiovascular: Negative for leg swelling  Gastrointestinal: Positive for constipation  Negative for abdominal pain, diarrhea, nausea and vomiting  Genitourinary: Negative for difficulty urinating and hematuria  Musculoskeletal: Negative for arthralgias, gait problem and myalgias  Skin: Positive for itching  Negative for rash  No new, changing, or concerning lesions  Neurological: Negative for extremity weakness, gait problem, headaches, light-headedness and numbness  Hematological: Negative for adenopathy  MEDICATIONS:    Current Outpatient Medications:     Aspirin Buf,CaCarb-MgCarb-MgO, 81 MG TABS, Take 81 mg by mouth, Disp: , Rfl:     benzonatate (TESSALON PERLES) 100 mg capsule, Take 100 mg by mouth 3 (three) times a day as needed for cough, Disp: , Rfl:     betamethasone dipropionate (DIPROSONE) 0 05 % cream, Apply topically 2 (two) times a day, Disp: , Rfl:     gemfibrozil (LOPID) 600 mg tablet, Take 600 mg by mouth, Disp: , Rfl:     lamoTRIgine (LaMICtal) 100 mg tablet, Take 50 mg by mouth Currently tapering off of this medication  , Disp: , Rfl:     levothyroxine 50 mcg tablet, Take 100 mcg by mouth daily, Disp: , Rfl:     pantoprazole (PROTONIX) 40 mg tablet, Take 40 mg by mouth daily, Disp: , Rfl:     simvastatin (ZOCOR) 40 mg tablet, Take 40 mg by mouth, Disp: , Rfl:     SUMAtriptan (IMITREX) 50 mg tablet, Take 50 mg by mouth, Disp: , Rfl:     levothyroxine 125 mcg tablet, Take 1 tablet (125 mcg total) by mouth daily, Disp: 30 tablet, Rfl: 5    ondansetron (ZOFRAN) 4 mg tablet, Take 1 tablet (4 mg total) by mouth every 8 (eight) hours as needed for nausea or vomiting for up to 15 days, Disp: 20 tablet, Rfl: 5  No current facility-administered medications for this visit        ALLERGIES:  Allergies   Allergen Reactions    Chocolate - Food Allergy Other (See Comments)    Iodinated Diagnostic Agents Hives     CT contrast dye      Penicillins Hives    Sulfa Antibiotics Hives    Banana - Food Allergy Headache        ACTIVE PROBLEMS:  Patient Active Problem List   Diagnosis    Malignant melanoma of scalp ([place] [address] )    Acquired hypothyroidism    Coronary arteriosclerosis    RBBB    Sarcoidosis    Seizures ([place] [address] )          PAST MEDICAL HISTORY:   Past Medical History:   Diagnosis Date    Migraines     Prostate cancer ([place] [address] )     Sarcoidosis     Skin cancer         PAST SURGICAL HISTORY:  Past Surgical History:   Procedure Laterality Date    HERNIA REPAIR      PROSTATE SURGERY          SOCIAL HISTORY:  Social History     Socioeconomic History    Marital status: /Civil Union     Spouse name: None    Number of children: None    Years of education: None    Highest education level: None   Occupational History    None   Social Needs    Financial resource strain: None    Food insecurity     Worry: None     Inability: None    Transportation needs     Medical: None     Non-medical: None   Tobacco Use    Smoking status: Former Smoker    Smokeless tobacco: Never Used   Substance and Sexual Activity    Alcohol use: Not Currently    Drug use: Never    Sexual activity: None   Lifestyle    Physical activity     Days per week: None     Minutes per session: None    Stress: None   Relationships    Social connections     Talks on phone: None     Gets together: None     Attends Restorationism service: None     Active member of club or organization: None     Attends meetings of clubs or organizations: None     Relationship status: None    Intimate partner violence     Fear of current or ex partner: None     Emotionally abused: None     Physically abused: None     Forced sexual activity: None   Other Topics Concern    None   Social History Narrative    None        FAMILY HISTORY:  History reviewed  No pertinent family history  Objective        PHYSICAL EXAMINATION:   Blood pressure 118/64, pulse 76, temperature 97 8 °F (36 6 °C), temperature source Temporal, resp  rate 14, height 5' 10" (1 778 m), weight 81 2 kg (179 lb), SpO2 98 %  Pain Score: 0-No pain     ECOG Performance Status      Most Recent Value   ECOG Performance Status  Restricted in physically strenuous activity but ambulatory and able to carry out work of a light or sedentary nature, e g , light house work, office work        ECOG PS 1       Physical Exam  Constitutional:       General: He is not in acute distress  Appearance: Normal appearance  He is not toxic-appearing  HENT:      Mouth/Throat:      Mouth: Mucous membranes are moist       Pharynx: Oropharynx is clear  Eyes:      General: No scleral icterus  Cardiovascular:      Rate and Rhythm: Normal rate and regular rhythm  Pulses: Normal pulses  Heart sounds: No murmur  No friction rub  No gallop  Pulmonary:      Effort: Pulmonary effort is normal  No respiratory distress  Breath sounds: Normal breath sounds  No wheezing or rales  Abdominal:      General: There is no distension  Palpations: There is no mass  Tenderness: There is no abdominal tenderness  There is no rebound  Musculoskeletal:         General: No swelling or tenderness  Right lower leg: No edema  Left lower leg: No edema  Lymphadenopathy:      Head:      Right side of head: No submandibular, preauricular or posterior auricular adenopathy  Left side of head: No submandibular, preauricular or posterior auricular adenopathy  Cervical: No cervical adenopathy  Right cervical: No superficial or posterior cervical adenopathy  Left cervical: No superficial or posterior cervical adenopathy        Upper Body:      Right upper body: No supraclavicular or axillary adenopathy  Left upper body: No supraclavicular or axillary adenopathy  Lower Body: No right inguinal adenopathy  No left inguinal adenopathy  Skin:     Findings: Lesion (scab on top of head) present  No rash  Comments: Well healed surgical scar  No evidence of recurrence at primary site  Healing scab in the anterior field  No associated masses or nodules and no lesions concerning for melanoma recurrence  Neurological:      General: No focal deficit present  Mental Status: He is alert and oriented to person, place, and time  Psychiatric:         Mood and Affect: Mood normal          Behavior: Behavior normal          Thought Content: Thought content normal          Judgment: Judgment normal          I reviewed lab data in the chart      White Blood Cell Count   Date Value Ref Range Status   04/28/2021 5 7 3 4 - 10 8 x10E3/uL Final   04/08/2021 5 0 3 4 - 10 8 x10E3/uL Final     Hemoglobin   Date Value Ref Range Status   04/28/2021 14 4 13 0 - 17 7 g/dL Final   04/08/2021 13 1 13 0 - 17 7 g/dL Final     Platelet Count   Date Value Ref Range Status   04/28/2021 251 150 - 450 x10E3/uL Final   04/08/2021 205 150 - 450 x10E3/uL Final     MCV   Date Value Ref Range Status   04/28/2021 85 79 - 97 fL Final   04/08/2021 85 79 - 97 fL Final      Potassium   Date Value Ref Range Status   04/28/2021 4 6 3 5 - 5 2 mmol/L Final   04/08/2021 4 3 3 5 - 5 2 mmol/L Final     Chloride   Date Value Ref Range Status   04/28/2021 97 96 - 106 mmol/L Final   04/08/2021 100 96 - 106 mmol/L Final     CO2   Date Value Ref Range Status   04/28/2021 23 20 - 29 mmol/L Final   04/08/2021 23 20 - 29 mmol/L Final     BUN   Date Value Ref Range Status   04/28/2021 19 8 - 27 mg/dL Final   04/08/2021 15 8 - 27 mg/dL Final     Creatinine   Date Value Ref Range Status   04/28/2021 1 22 0 76 - 1 27 mg/dL Final   04/08/2021 1 08 0 76 - 1 27 mg/dL Final     Glucose, Random   Date Value Ref Range Status   04/28/2021 104 (H) 65 - 99 mg/dL Final   04/08/2021 100 (H) 65 - 99 mg/dL Final     eGFR    Date Value Ref Range Status   04/28/2021 68 >59 mL/min/1 73 Final     Comment:     **[facility] currently reports eGFR in compliance with the current**    recommendations of the [employer]  Andres Rice will  update   reporting as new guidelines are published from the NKF-ASN  Task force  04/08/2021 78 >59 mL/min/1 73 Final     Albumin   Date Value Ref Range Status   04/28/2021 4 2 3 7 - 4 7 g/dL Final   04/08/2021 3 8 3 7 - 4 7 g/dL Final     TOTAL BILIRUBIN   Date Value Ref Range Status   04/28/2021 0 5 0 0 - 1 2 mg/dL Final   04/08/2021 0 4 0 0 - 1 2 mg/dL Final     AST   Date Value Ref Range Status   04/28/2021 17 0 - 40 IU/L Final   04/08/2021 16 0 - 40 IU/L Final     ALT   Date Value Ref Range Status   04/28/2021 13 0 - 44 IU/L Final   04/08/2021 12 0 - 44 IU/L Final      LDH   Date Value Ref Range Status   04/28/2021 141 121 - 224 IU/L Final   04/08/2021 172 121 - 224 IU/L Final     TSH   Date Value Ref Range Status   04/28/2021 11 300 (H) 0 450 - 4 500 uIU/mL Final   04/08/2021 7 580 (H) 0 450 - 4 500 uIU/mL Final     No results found for: X9WHYUH   No results found for: FREET4      RECENT IMAGING:  Procedure: Nm Pet Ct Tumor Imaging Whole Body    Result Date: 4/13/2021  Narrative: WHOLE-BODY PET/CT SCAN INDICATION: Restaging of malignant melanoma of the scalp  Scalp excision 7/9/2020  Additional history of prostate cancer in 2006  C43 4: Malignant melanoma of scalp and neck MODIFIER: PS COMPARISON: Outside PET/CT 11/9/2020 CELL TYPE:  Ulcerated nodular malignant melanoma, left posterior scalp 6/15/2020 TECHNIQUE:   8 3 mCi [id number] administered IV  Multiplanar attenuation corrected and non attenuation corrected PET images were acquired 60 minutes post injection   Contiguous, low dose, axial CT sections were obtained from the skull vertex through the feet  Intravenous contrast material was not utilized  This examination, like all CT scans performed in the [facility], was performed utilizing techniques to minimize radiation dose exposure, including the use of iterative reconstruction  and automated exposure control  Fasting serum glucose: 104 mg/dl FINDINGS: VISUALIZED BRAIN:   No acute abnormalities are seen  HEAD/NECK:   No suspicious FDG uptake at the scalp  Diffuse radiotracer uptake at the thyroid gland, SUV max of 5 7  New small FDG avid lymph nodes in the right lower neck identified  A right supraclavicular lymph node demonstrates a SUV max of 1 8  A thin lymph node measures up to 8 mm in size image 85 series 2  A right subclavicular lymph node demonstrates a SUV max of 2 2  This measures 1 cm in size image 91 series 2  CT images: No additional significant findings  CHEST:   Multiple new FDG avid lymph nodes in the mediastinal and bilateral perihilar regions  A right perihilar focus demonstrates SUV max of 12 3  A left perihilar focus demonstrates SUV max of 6 5  Subcarinal lymph node demonstrates SUV max of 24 6  This measures up to 1 4 cm short axis image 113 series 2, new from the prior exam   Right precarinal lymph node demonstrates SUV max of 6 6  This measures 1 cm in size image 105 series 2 also new  Nodular interstitial changes and fibrosis in the lung fields bilaterally with patchy FDG uptake most extensive in the lower lobes, SUV max of 2 3 in the right lower lobe and 2 1 in the left lower lobe  CT findings are similar to the prior outside exam  CT images: Moderate coronary artery calcifications  Small hiatal hernia  ABDOMEN:   Scattered new FDG avid lymph nodes in the gastrohepatic region and aleksandar hepatis  A gastrohepatic lymph node demonstrates SUV max of 6 4  This measures 1 9 x 0 7 cm image 145 series 2  Portacaval lymph node demonstrates SUV max of 8 2  This measures 1 9 x 1 0 cm image 147 series 2   CT images: No additional significant findings  PELVIS: No FDG avid soft tissue lesions are seen  CT images: Status post prostatectomy OSSEOUS STRUCTURES/EXTREMITIES: No FDG avid lesions are seen  CT images: No significant findings  Impression: 1  Multiple new FDG avid lymph nodes in the lower neck, chest and abdomen for which metastasis should be excluded  Patient is noted to have a history of sarcoidosis which could also explain these findings along with the pulmonary nodular interstitial changes  Tissue sampling may be warranted for definitive confirmation  2  Diffuse thyroid gland activity, could be related to thyroiditis  Workstation performed: [id number]          Assessment    Assessment/Plan  Mr Gary Asif is a 66 yr male with 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 treatment with pembrolizumab here for follow up and treatment  Acquired hypothyroidism  Mr  Jacinta Ochoa TSH continues to rise  He does state he is more tired,but thinks this is due to lamictal, which he just began to titrate  However, his TSH was elevated at his last visit and continues to increase  We will increase his levothyroxine to 125 mcg  He knows the signs and symptoms to look for regarding hyperthyroidism  We will recheck labs in 6 weeks prior to his next treatment, as it takes some time for the body to respond to changes in levothyroxine dosing  He knows to call if there are issues or concerns  Malignant melanoma of scalp [facility])  Mr Gary Asif is a 71yr male with 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 treatment with pembrolizumab here for follow up and treatment  He is doing well  He has a chronic dry cough that he is using Tessalon perles for, as prescribed by PCP  O2 stats ok and no SOB  Will continue to monitor, and can get Chest CT if increased concern for pneumonitis  Has some localized skin itching on upper legs/thighs that he is using topical steroids, as hydroxyzine makes him too sleepy and this is ok to continue    Healing scab on scalp from bumping it one week ago  Monitor healing, and if appears to not heal, recommend biopsy, as this is site of primary and recurrent melanoma  Labs reviewed and ok to treat  Cycle #4 pembrolizumab today  Due for repeat scan at end of June  Will give him script at his next visit  He will return in 6 weeks with labs (scripts provided) for his next treatment  He knows to call with any issues or concerns prior to his next visit  Return in about 6 months (around 11/6/2021), or office visit and infusion visit       Christa Barrientos MD, PhD

## 2021-05-13 ENCOUNTER — TELEPHONE (OUTPATIENT)
Dept: HEMATOLOGY ONCOLOGY | Facility: CLINIC | Age: 71
End: 2021-05-13

## 2021-05-13 NOTE — TELEPHONE ENCOUNTER
----- Message from Anne Turpin sent at 5/13/2021 10:31 AM EDT -----  Regarding: FW: Non-Urgent Medical Question  Contact: [phone]    ----- Message -----  From: Mari Howard  Sent: 5/13/2021  10:10 AM EDT  To: Hematology Oncology [facility] ED Clinical  Subject: Non-Urgent Medical Question                      Dr Khloe Campoverde - I had a routine dental appointment today that included a 360 degree x-ray  The dentist called me later and said that there was something on the x-ray that hadn't been on the last one they did  He described it as a "possible lesion, left angle of the mandible" and suggested that I follow up with you  He emailed the images to me  Do you want to see them?     Torri Louis

## 2021-05-30 ENCOUNTER — PATIENT MESSAGE (OUTPATIENT)
Dept: HEMATOLOGY ONCOLOGY | Facility: CLINIC | Age: 71
End: 2021-05-30

## 2021-06-01 ENCOUNTER — TELEPHONE (OUTPATIENT)
Dept: HEMATOLOGY ONCOLOGY | Facility: CLINIC | Age: 71
End: 2021-06-01

## 2021-06-01 DIAGNOSIS — E03.9 ACQUIRED HYPOTHYROIDISM: ICD-10-CM

## 2021-06-01 DIAGNOSIS — R11.0 NAUSEA: ICD-10-CM

## 2021-06-01 DIAGNOSIS — R06.02 SHORTNESS OF BREATH: ICD-10-CM

## 2021-06-01 DIAGNOSIS — C43.4 MALIGNANT MELANOMA OF SCALP (HCC): Primary | ICD-10-CM

## 2021-06-01 RX ORDER — PREDNISONE 20 MG/1
80 TABLET ORAL 2 TIMES DAILY WITH MEALS
Qty: 112 TABLET | Refills: 0 | Status: SHIPPED | OUTPATIENT
Start: 2021-06-01 | End: 2021-07-07 | Stop reason: SDUPTHER

## 2021-06-01 NOTE — TELEPHONE ENCOUNTER
Called and spoke with patient  CT chest ordered, patient states he gets his imaging done at [place] [address] in [address]  ECHO was ordered, patient states last time he had this completed at [facility] in [place]  Blood work ordered, patient aware this needs to be done before 9am  Patient completes blood work at [facility], orders emailed to patient at [email or web address]  Prednisone sent to patient's pharmacy  Patient is aware I will call to schedule CT chest and ECHO, patient states if there is nothing available at [place] [address] or [facility], he is willing to drive to [facility]

## 2021-06-01 NOTE — TELEPHONE ENCOUNTER
----- Message from Anne Vinson sent at 6/1/2021  8:31 AM EDT -----  Regarding: FW: Non-Urgent Medical Question  Contact: [phone]    ----- Message -----  From: Lloyd Mckeon  Sent: 5/30/2021   5:05 PM EDT  To: Hematology Oncology [facility] Clinical  Subject: Non-Urgent Medical Question                      Dr Francisco Marrero: I have been feeling really lousy for over a week and thought it about time that I shared it with you  The fatigue and nausea I have talked about in past visits you have grown much worse  I have no energy to do things I should do or enjoy doing; and nausea and loss of appetite are almost constant  I have lost interest in even my favorite foods and cannot finish even small portions of any meal  I also have a constant cough, which is exhausting, and shortness of breath when I walk up steps quickly or exert myself in any way  Any deep breath causes a coughing spasm  My voice is often weak and raspy  Then there is the itch   oh, and constipation  There isn't much I can do about fatigue except sleep a lot  I'm taking Zofran for nausea (it works for a while); Tessalon Pearls for the cough (also works for a while); betamethasone for the itch; and Colace for constipation  The past week or so is the first that I have felt really sick since all this started, and I do feel sick  I don't know if any of this is due to immunotherapy, but I appreciate hearing your thoughts      Mara Mo

## 2021-06-01 NOTE — TELEPHONE ENCOUNTER
CT chest without contrast scheduled for 1:15 today at [facility]  in [facility]  Order faxed to [phone]  ECHO scheduled for 2:15 today at [facility]  Order faxed to [phone]  Confirmed with oncology finance that authorizations are not required as patient has Medicare  Patient made aware of the above   He will  prednisone today and start taking, will complete blood work tomorrow morning prior to 9am

## 2021-06-03 LAB
ACTH PLAS-MCNC: <1.5 PG/ML (ref 7.2–63.3)
ALBUMIN SERPL-MCNC: 4.2 G/DL (ref 3.7–4.7)
ALBUMIN/GLOB SERPL: 1.2 {RATIO} (ref 1.2–2.2)
ALP SERPL-CCNC: 108 IU/L (ref 48–121)
ALT SERPL-CCNC: 10 IU/L (ref 0–44)
AST SERPL-CCNC: 12 IU/L (ref 0–40)
BASOPHILS # BLD AUTO: 0 X10E3/UL (ref 0–0.2)
BASOPHILS NFR BLD AUTO: 0 %
BILIRUB SERPL-MCNC: 0.5 MG/DL (ref 0–1.2)
BUN SERPL-MCNC: 21 MG/DL (ref 8–27)
BUN/CREAT SERPL: 17 (ref 10–24)
CALCIUM SERPL-MCNC: 9.8 MG/DL (ref 8.6–10.2)
CHLORIDE SERPL-SCNC: 91 MMOL/L (ref 96–106)
CO2 SERPL-SCNC: 19 MMOL/L (ref 20–29)
CORTIS AM PEAK SERPL-MCNC: 3.5 UG/DL (ref 6.2–19.4)
CREAT SERPL-MCNC: 1.22 MG/DL (ref 0.76–1.27)
EOSINOPHIL # BLD AUTO: 0 X10E3/UL (ref 0–0.4)
EOSINOPHIL NFR BLD AUTO: 0 %
ERYTHROCYTE [DISTWIDTH] IN BLOOD BY AUTOMATED COUNT: 12.8 % (ref 11.6–15.4)
GLOBULIN SER-MCNC: 3.5 G/DL (ref 1.5–4.5)
GLUCOSE SERPL-MCNC: 154 MG/DL (ref 65–99)
HCT VFR BLD AUTO: 45.3 % (ref 37.5–51)
HGB BLD-MCNC: 15.4 G/DL (ref 13–17.7)
IMM GRANULOCYTES # BLD: 0 X10E3/UL (ref 0–0.1)
IMM GRANULOCYTES NFR BLD: 0 %
LDH SERPL-CCNC: 119 IU/L (ref 121–224)
LYMPHOCYTES # BLD AUTO: 0.4 X10E3/UL (ref 0.7–3.1)
LYMPHOCYTES NFR BLD AUTO: 7 %
MCH RBC QN AUTO: 29.2 PG (ref 26.6–33)
MCHC RBC AUTO-ENTMCNC: 34 G/DL (ref 31.5–35.7)
MCV RBC AUTO: 86 FL (ref 79–97)
MONOCYTES # BLD AUTO: 0.2 X10E3/UL (ref 0.1–0.9)
MONOCYTES NFR BLD AUTO: 3 %
NEUTROPHILS # BLD AUTO: 5.2 X10E3/UL (ref 1.4–7)
NEUTROPHILS NFR BLD AUTO: 90 %
PLATELET # BLD AUTO: 364 X10E3/UL (ref 150–450)
POTASSIUM SERPL-SCNC: 4.8 MMOL/L (ref 3.5–5.2)
PROT SERPL-MCNC: 7.7 G/DL (ref 6–8.5)
RBC # BLD AUTO: 5.28 X10E6/UL (ref 4.14–5.8)
SL AMB EGFR AFRICAN AMERICAN: 68 ML/MIN/1.73
SL AMB EGFR NON AFRICAN AMERICAN: 58 ML/MIN/1.73
SODIUM SERPL-SCNC: 128 MMOL/L (ref 134–144)
T3FREE SERPL-MCNC: 2.3 PG/ML (ref 2–4.4)
TSH SERPL DL<=0.005 MIU/L-ACNC: 0.27 UIU/ML (ref 0.45–4.5)
WBC # BLD AUTO: 5.8 X10E3/UL (ref 3.4–10.8)

## 2021-06-03 NOTE — RESULT ENCOUNTER NOTE
Called Mr  Nathaniel Ochoa to discuss his labs - with low Na and symptoms, concerning for adrenal insufficiency  Awaiting cortisol and ACTH to confirm  Had started him on prednisone 2 mg/kg daily out of concern for pneumonitis vs adrenal insufficency  CT Chest without evidence of pneumonitis and ECHO with preserved EF  Reduced him to 1mg /kg prednisone today (80mg) and then 40 mg tomorrow through the weekend and down to 20 mg starting Monday, June 7, 2021 and continue for the week       We will check in with him on Monday and determine how his is feeling

## 2021-06-07 ENCOUNTER — TELEPHONE (OUTPATIENT)
Dept: HEMATOLOGY ONCOLOGY | Facility: CLINIC | Age: 71
End: 2021-06-07

## 2021-06-07 NOTE — TELEPHONE ENCOUNTER
Called and spoke with patient  He states he was feeling much better when he was on the full dose of steroids, but as he has tapered down, he is not feeling well  Nausea is gone but still does not have much of an appetite, but is eating and drinking  Patient has a cough but is slightly better  States he is fatigued but states this may be because he has not been able to sleep much with the steroids  Shortness of breath has subsided  Constipation is still present, he has a BM once every 4-5 days  He is taking colace  Patient has taken his steroid dose today- 20 mg  Will discuss symptoms with Dr Uri Hernandez  Patient is aware I will call him back with further recommendations

## 2021-06-07 NOTE — TELEPHONE ENCOUNTER
Patient to increase prednisone to 80mg daily x1 week, will taper down by 10 mg weekly depending on how he is feeling  He can try senokot daily with the colace, or he can try miralax daily/BID  Patient states he has been increasing his fiber and fluid intake  Will call patient on Wednesday and see how he is feeling  Patient is aware of the above and will call back in the meantime with any worsening symptoms

## 2021-06-07 NOTE — TELEPHONE ENCOUNTER
----- Message from Keyla Elizondo MD sent at 6/3/2021 10:27 AM EDT -----  Called Mr  Seven Armstrong to discuss his labs - with low Na and symptoms, concerning for adrenal insufficiency  Awaiting cortisol and ACTH to confirm  Had started him on prednisone 2 mg/kg daily out of concern for pneumonitis vs adrenal insufficency  CT Chest without evidence of pneumonitis and ECHO with preserved EF  Reduced him to 1mg /kg prednisone today (80mg) and then 40 mg tomorrow through the weekend and down to 20 mg starting Monday, June 7, 2021 and continue for the week       We will check in with him on Monday and determine how his is feeling

## 2021-06-08 ENCOUNTER — PATIENT MESSAGE (OUTPATIENT)
Dept: HEMATOLOGY ONCOLOGY | Facility: CLINIC | Age: 71
End: 2021-06-08

## 2021-06-08 ENCOUNTER — TELEPHONE (OUTPATIENT)
Dept: SURGICAL ONCOLOGY | Facility: CLINIC | Age: 71
End: 2021-06-08

## 2021-06-08 ENCOUNTER — TELEPHONE (OUTPATIENT)
Dept: HEMATOLOGY ONCOLOGY | Facility: CLINIC | Age: 71
End: 2021-06-08

## 2021-06-08 DIAGNOSIS — C43.4 MALIGNANT MELANOMA OF SCALP (HCC): Primary | ICD-10-CM

## 2021-06-08 DIAGNOSIS — R25.1 TREMOR OF LEFT HAND: ICD-10-CM

## 2021-06-08 DIAGNOSIS — R25.1 TREMOR OF RIGHT HAND: ICD-10-CM

## 2021-06-08 DIAGNOSIS — R25.1 TREMOR OF RIGHT HAND: Primary | ICD-10-CM

## 2021-06-08 DIAGNOSIS — C43.4 MALIGNANT MELANOMA OF SCALP (HCC): ICD-10-CM

## 2021-06-08 NOTE — TELEPHONE ENCOUNTER
Nara Burch called, he asked to speak with Javier Killian, please speak with Nara Burch when you have the time

## 2021-06-08 NOTE — TELEPHONE ENCOUNTER
Called and spoke with patient  He is feeling much better  His cough and nausea have subsided  He is eating better and was able to sleep well last night  He will be going to the store this morning to pickup miralax which he will take with colace  Patient also mentions over the past few weeks, he has noticed a tremor in both of his hands  He states he notices his hand shakes when trying to complete his crossword puzzles and when lifting up a glass  He tried using a screwdriver a few days ago and had to stabilize  his right hand with his left  Patient states after increasing the prednisone yesterday, the tremor has gone away  Patient is aware I will notify Dr Desi Kaplan and call him back

## 2021-06-08 NOTE — TELEPHONE ENCOUNTER
Discussed with Dr Марина Rosario  Patient to continue on current dose of prednisone for the week  Will call patient on Friday to see how he is doing and will adjust dose accordingly  Discussed new hand tremors relieved by prednisone with Dr Марина Rosario  CT head w wo contrast ordered, scheduled for Monday June 14th at 11:30 at [place] [address] in [place] [address] [address]  Patient made aware  Patient is aware I will call him on Friday to see how he is doing  He is aware to call with any new or worsening symptoms in the meantime

## 2021-06-08 NOTE — TELEPHONE ENCOUNTER
CT head changed to MRI brain  [facility] cannot perform MRI as patient has a loop recorder  Called [facility] in [place]  Attempted to scheduled MRI brain  Russ Reyes from central scheduling states patient needs to call to give card information for loop recorder  Called and spoke with patient  Provided him with number for [facility] central scheduling to provide card information and schedule MRI brain, patient verbalized understanding  Patient is aware I called [facility] and cancelled CT head  Patient will send message through [email or web address] to let me know what date MRI brain is scheduled for

## 2021-06-09 ENCOUNTER — PATIENT MESSAGE (OUTPATIENT)
Dept: HEMATOLOGY ONCOLOGY | Facility: CLINIC | Age: 71
End: 2021-06-09

## 2021-06-09 ENCOUNTER — TELEPHONE (OUTPATIENT)
Dept: HEMATOLOGY ONCOLOGY | Facility: CLINIC | Age: 71
End: 2021-06-09

## 2021-06-09 NOTE — TELEPHONE ENCOUNTER
Called [facility] and scheduled patient for MRI brain on 6/16 at 2:45 at [address] in [place]  Order and [employer] card faxed to [phone]  Patient will need to have loop recorder read prior to MRI  Called and spoke with patient  He is aware of appointment and will contact cardiologist to have loop recorder read prior to MRI

## 2021-06-09 NOTE — TELEPHONE ENCOUNTER
Spoke with patient again today  He was unable to schedule MRI as  at [facility] states they do not have the order  Patient will send image of Loop recorder card through [employer] and I will try to call to schedule MRI

## 2021-06-10 RX ORDER — SODIUM CHLORIDE 9 MG/ML
20 INJECTION, SOLUTION INTRAVENOUS ONCE
Status: CANCELLED | OUTPATIENT
Start: 2021-06-17

## 2021-06-11 ENCOUNTER — TELEPHONE (OUTPATIENT)
Dept: HEMATOLOGY ONCOLOGY | Facility: CLINIC | Age: 71
End: 2021-06-11

## 2021-06-11 NOTE — TELEPHONE ENCOUNTER
Called and spoke with patient  He is feeling well on 80 mg prednisone daily  Shortness of breath, nausea, fatigue and cough have subsided  He is eating well  Experiencing some insomnia which I explained can be caused by the prednisone  He has started using miralax daily along with colace and states this has helped with his BMs  He will decrease prednisone to 60mg daily starting Sunday 6/13  He has an appointment with Dr Sera Ivey on Thursday 6/17, but will call in the meantime with any symptoms

## 2021-06-15 ENCOUNTER — TELEPHONE (OUTPATIENT)
Dept: HEMATOLOGY ONCOLOGY | Facility: CLINIC | Age: 71
End: 2021-06-15

## 2021-06-15 NOTE — TELEPHONE ENCOUNTER
Called and spoke with patient  He states he is doing well on the prednisone 60 mg  He will complete blood work today for infusion on Thursday at [address] [place] [address]  MRI brain scheduled tomorrow  Follow up appointment with Dr Priscila Peralta on Thursday

## 2021-06-17 ENCOUNTER — OFFICE VISIT (OUTPATIENT)
Dept: HEMATOLOGY ONCOLOGY | Facility: CLINIC | Age: 71
End: 2021-06-17
Payer: MEDICARE

## 2021-06-17 ENCOUNTER — HOSPITAL ENCOUNTER (OUTPATIENT)
Dept: INFUSION CENTER | Facility: CLINIC | Age: 71
Discharge: HOME | End: 2021-06-17

## 2021-06-17 VITALS
TEMPERATURE: 98 F | HEART RATE: 94 BPM | SYSTOLIC BLOOD PRESSURE: 114 MMHG | DIASTOLIC BLOOD PRESSURE: 72 MMHG | RESPIRATION RATE: 16 BRPM | BODY MASS INDEX: 23.25 KG/M2 | WEIGHT: 157 LBS | HEIGHT: 69 IN | OXYGEN SATURATION: 98 %

## 2021-06-17 DIAGNOSIS — D86.9 SARCOIDOSIS: ICD-10-CM

## 2021-06-17 DIAGNOSIS — C43.4 MALIGNANT MELANOMA OF SCALP (HCC): Primary | ICD-10-CM

## 2021-06-17 DIAGNOSIS — E27.3 ADRENAL INSUFFICIENCY DUE TO CANCER THERAPY (HCC): ICD-10-CM

## 2021-06-17 DIAGNOSIS — E03.9 ACQUIRED HYPOTHYROIDISM: ICD-10-CM

## 2021-06-17 PROCEDURE — 1123F ACP DISCUSS/DSCN MKR DOCD: CPT | Performed by: INTERNAL MEDICINE

## 2021-06-17 PROCEDURE — 99215 OFFICE O/P EST HI 40 MIN: CPT | Performed by: INTERNAL MEDICINE

## 2021-06-18 LAB
ALBUMIN SERPL-MCNC: 4 G/DL (ref 3.7–4.7)
ALBUMIN/GLOB SERPL: 1.3 {RATIO} (ref 1.2–2.2)
ALP SERPL-CCNC: 79 IU/L (ref 48–121)
ALT SERPL-CCNC: 17 IU/L (ref 0–44)
AST SERPL-CCNC: 11 IU/L (ref 0–40)
BASOPHILS # BLD AUTO: 0 X10E3/UL (ref 0–0.2)
BASOPHILS NFR BLD AUTO: 0 %
BILIRUB SERPL-MCNC: 0.5 MG/DL (ref 0–1.2)
BUN SERPL-MCNC: 30 MG/DL (ref 8–27)
BUN/CREAT SERPL: 26 (ref 10–24)
CALCIUM SERPL-MCNC: 9.5 MG/DL (ref 8.6–10.2)
CHLORIDE SERPL-SCNC: 97 MMOL/L (ref 96–106)
CO2 SERPL-SCNC: 21 MMOL/L (ref 20–29)
CREAT SERPL-MCNC: 1.16 MG/DL (ref 0.76–1.27)
EOSINOPHIL # BLD AUTO: 0 X10E3/UL (ref 0–0.4)
EOSINOPHIL NFR BLD AUTO: 0 %
ERYTHROCYTE [DISTWIDTH] IN BLOOD BY AUTOMATED COUNT: 13 % (ref 11.6–15.4)
GLOBULIN SER-MCNC: 3 G/DL (ref 1.5–4.5)
GLUCOSE SERPL-MCNC: 127 MG/DL (ref 65–99)
HCT VFR BLD AUTO: 44.2 % (ref 37.5–51)
HGB BLD-MCNC: 15.2 G/DL (ref 13–17.7)
IMM GRANULOCYTES # BLD: 0.1 X10E3/UL (ref 0–0.1)
IMM GRANULOCYTES NFR BLD: 1 %
LDH SERPL-CCNC: 129 IU/L (ref 121–224)
LYMPHOCYTES # BLD AUTO: 0.3 X10E3/UL (ref 0.7–3.1)
LYMPHOCYTES NFR BLD AUTO: 2 %
MCH RBC QN AUTO: 29.7 PG (ref 26.6–33)
MCHC RBC AUTO-ENTMCNC: 34.4 G/DL (ref 31.5–35.7)
MCV RBC AUTO: 86 FL (ref 79–97)
MONOCYTES # BLD AUTO: 0.2 X10E3/UL (ref 0.1–0.9)
MONOCYTES NFR BLD AUTO: 2 %
NEUTROPHILS # BLD AUTO: 10.3 X10E3/UL (ref 1.4–7)
NEUTROPHILS NFR BLD AUTO: 95 %
PLATELET # BLD AUTO: 267 X10E3/UL (ref 150–450)
POTASSIUM SERPL-SCNC: 4.9 MMOL/L (ref 3.5–5.2)
PROT SERPL-MCNC: 7 G/DL (ref 6–8.5)
RBC # BLD AUTO: 5.12 X10E6/UL (ref 4.14–5.8)
SL AMB EGFR AFRICAN AMERICAN: 72 ML/MIN/1.73
SL AMB EGFR NON AFRICAN AMERICAN: 62 ML/MIN/1.73
SL AMB T4, FREE (DIRECT): 1.94 NG/DL (ref 0.82–1.77)
SODIUM SERPL-SCNC: 133 MMOL/L (ref 134–144)
T3FREE SERPL-MCNC: 1.9 PG/ML (ref 2–4.4)
TSH SERPL DL<=0.005 MIU/L-ACNC: 0.23 UIU/ML (ref 0.45–4.5)
WBC # BLD AUTO: 10.8 X10E3/UL (ref 3.4–10.8)

## 2021-06-18 NOTE — ASSESSMENT & PLAN NOTE
Diagnosis since his last visit here  He had called with issues have having profound fatigue  A m  cortisol and ACTH were below lower limits of normal and his sodium was low, all consistent with the diagnosis of adrenal insufficiency  He was treated with high-dose steroids and improved  We are slowly tapering down his prednisone  Will continue to assess his fatigue as we decrease his prednisone dose  Plan to switch over to hydrocortisone 1 we get down to prednisone 10 mg daily  If needed, we can have him see an endocrinologist to help manage this as well as his hypothyroidism  He is currently on 60 mg of prednisone daily  We discussed that we will decrease his dose by 10 mg weekly  He will drop to 50 mg of prednisone on Monday June 21, 2021  I counseled him on getting a medical alert bracelet or necklace designating that he has adrenal insufficiency

## 2021-06-18 NOTE — ASSESSMENT & PLAN NOTE
Since his last visit, Mr devorah marsh was diagnosed with adrenal insufficiency  He had called in with worsening and profound fatigue not being able to get out of bed, short of breath, and just overall not feeling well  CT chest ruled out pneumonitis, echo rule out heart failure, and a m  cortisol and ACTH were low, indicative of adrenal insufficiency  He was started on high-dose steroids in the event that he did have pneumonitis while we were waiting for the CT scan results  He was then transitioned to prednisone daily 1 mg/kg and he is currently on 60 mg daily  We will continue with slow taper  He also endorsed having a new tremor in bilateral hands  He states this has gotten worse over the past couple months, and was actually improved on high-dose steroids  Brain MRI demonstrates possible mild small-vessel ischemia and differential diagnosis including central pontine myelinolysis lysis as well as a demyelinating process, and inflammatory process including Infectious etiology, and a metabolic process  We will have him follow with his neurologist, and I will call to speak to him directly about my concerns  Given the clear etiology of findings on his brain MRI, we will hold treatment today with pembrolizumab  I discussed that there is not really and concern with having to skip a dose of immunotherapy, because the T-cells are still activated in his body and fighting any signs of melanoma  We will reassess timing of administration of next dose of pembrolizumab  He is also due for repeat PET-CT at this time, given diffuse lymphadenopathy on his last scans, thought to be due to his recent COVID-19 vaccination  This repeat PET scan will confirm that this is the case  He will return in 4 weeks with scans and labs  All scripts provided to him  He and his wife know to call if there is any issues or concerns prior to his next visit

## 2021-06-18 NOTE — ASSESSMENT & PLAN NOTE
Increased his levothyroxine dose at his last visit  Fatigue has improved, but I suspect this is a combination of increased levothyroxine dose as well as steroids for his recently diagnosed adrenal insufficiency  TSH is just below the lower limit of normal, and we will continue to monitor over time  If we need to adjust the levothyroxine further we will  If needed we will also refer him to an endocrinologist to help manage his hypothyroidism and new adrenal insufficiency

## 2021-06-18 NOTE — ASSESSMENT & PLAN NOTE
Appears to be stable  Recent imaging demonstrates some underlying lung issues that have been seen on previous scans  Hard to compare across scans as one was PET and one was a CT scan  Will continue to monitor as well as consult with his sarcoidosis specialist as needed  Of note, his chronic dry cough has resolved since been on steroids

## 2021-06-18 NOTE — PROGRESS NOTES
[facility] HEMATOLOGY ONCOLOGY SPECIALISTS [facility]  [address] [facility] [address]  [phone]     Date of Visit: 6/17/2021  Name: Angeles Ortiz   YOB: 1950     Subjective    Subjective    VISIT DIAGNOSIS:  Diagnoses and all orders for this visit:    Malignant melanoma of scalp [facility])    Acquired hypothyroidism    Adrenal insufficiency due to cancer therapy [facility])    Sarcoidosis        Oncology History   Malignant melanoma of scalp ([facility] [address] )   5/15/2020 Initial Diagnosis    Malignant melanoma of scalp ([facility] [address] )     6/15/2020 -  Cancer Staged    Staging form: Melanoma of the Skin, AJCC 8th Edition  - Clinical stage from 6/15/2020: Stage IIB (cT3b, cN0, cM0) - Signed by Keith Colunga MD on 3/22/2021       10/26/2020 -  Cancer Staged    Staging form: Melanoma of the Skin, AJCC 8th Edition  - Pathologic stage from 10/26/2020: Stage IIIC (pT3b, pN1c, cM0) - Signed by Keith Colunga MD on 3/22/2021       11/2/2020 - 11/2/2020 Radiation    Performed at location closer to home    Radiation to scalp         12/28/2020 -  Chemotherapy    pembrolizumab (KEYTRUDA) 400 mg in sodium chloride 0 9 % 50 mL IVPB, 400 mg (200 % of original dose 200 mg), Intravenous, Once, 4 of 9 cycles  Dose modification: 400 mg (original dose 200 mg, Cycle 1, Reason: Other (Must fill in a comment), Comment: new 6 week dosing)  Administration: 400 mg (5/6/2021)        Cancer Staging  Malignant melanoma of scalp ([facility] [address] )  Staging form: Melanoma of the Skin, AJCC 8th Edition  - Clinical stage from 6/15/2020: Stage IIB (cT3b, cN0, cM0) - Signed by Keith Colunga MD on 3/22/2021  - Pathologic stage from 10/26/2020: Stage IIIC (pT3b, pN1c, cM0) - Signed by Keith Colunga MD on 3/22/2021     Treatment Details   Treatment goal Curative   Plan Name OP Pembrolizumab Q 42 Days   Status Active   Start Date 12/28/2020   End Date 12/2/2021 (Planned)   Provider Keith Colunga MD   Chemotherapy pembrolizumab [facility]) IVPB, 400 mg (200 % of original dose 200 mg), Intravenous, Once, 4 of 9 cycles  Dose modification: 400 mg (original dose 200 mg, Cycle 1, Reason: Other (Must fill in a comment), Comment: new 6 week dosing)  Administration: 400 mg (5/6/2021)          HISTORY OF PRESENT ILLNESS: Tony Davies is a 66 y o  male  who Stage IIIC melanoma on treatment with pembrolizumab  here for follow up and treamt  Since his last visit, Mr Christel Davis has been diagnosed with adrenal insufficiency  He sent a message in through [address] on 05/30/2021 and he was having increased fatigue and really feeling lousy  He was having worsening nausea, he did not have any energy to do anything he wanted to do  He had a complete loss of appetite  Along with his exhaustion, he was having shortness of breath while he was walking up steps and any type of exertion as well  We got him a stat CT chest to rule out pneumonitis, echo to rule out heart failure, and a m  labs to look for cortisol and ACTH  We started him on high-dose steroids in the event that this was pneumonitis while waiting for results to come back  CT chest was negative, echo was normal, but a m  cortisol and ACTH were low confirm the diagnosis of adrenal insufficiency  We tapered his steroids to 1 mg/kg and have been slowly tapering down  He is currently on 60 mg of prednisone a day  On this dose he states his nausea has resolved, his cough has resolved, and he overall feels well  He is not having any fatigue or exhaustion  His nausea is still minimal, only intermittent at this time, he actually needed to take an antinausea medication this morning with his bout of nausea, but this is the 1st he has experienced over the past couple weeks  His appetite is still minimal, but improved over what it was prior to starting the steroids  He also describes having a tremor that developed over the past couple months and has slowly been getting worse    It is affecting his writing and being able to do fine motor skills  He denies any numbness or tingling, or neuropathy  The tremor seemed to somewhat improve on high-dose steroids, 2 mg/kg, but has returned now that we have lowered the dose  We did get a brain MRI to evaluate the potential etiology of the symptoms  Brain MRI demonstrated FLAIR hyperintensities in the periventricular and subcortical white matter, as well as in the leana  The differential diagnosis includes small vessel ischemic disease, and more concerning diagnoses are central pontine myelinolysis, a demyelinating process, an inflammatory process including infectious etiology, as well as metabolic process  He denies any immediate issues at the time of his visit today  He denies any new, changing, or concerning skin lesions  He denies any new lymphadenopathy  REVIEW OF SYSTEMS:  Review of Systems   Constitutional: Positive for appetite change (not to oimproved), fatigue (improving) and unexpected weight change (weight loss with decreased appetite)  Negative for fever  HENT:   Negative for lump/mass  Eyes: Negative for icterus  Respiratory: Negative for cough (improved/resolved), shortness of breath and wheezing  Cardiovascular: Negative for leg swelling  Gastrointestinal: Positive for constipation and nausea  Negative for abdominal pain, diarrhea and vomiting  Genitourinary: Negative for difficulty urinating and hematuria  Musculoskeletal: Negative for arthralgias, gait problem and myalgias  Skin: Negative for itching and rash  No new, changing, or concerning lesions  Neurological: Negative for extremity weakness, gait problem, headaches, light-headedness and numbness  + tremors in bilateral hands   Hematological: Negative for adenopathy          MEDICATIONS:    Current Outpatient Medications:     Aspirin Buf,CaCarb-MgCarb-MgO, 81 MG TABS, Take 81 mg by mouth, Disp: , Rfl:     benzonatate (TESSALON PERLES) 100 mg capsule, Take 100 mg by mouth 3 (three) times a day as needed for cough, Disp: , Rfl:     betamethasone dipropionate (DIPROSONE) 0 05 % cream, Apply topically 2 (two) times a day, Disp: , Rfl:     gemfibrozil (LOPID) 600 mg tablet, Take 600 mg by mouth, Disp: , Rfl:     lamoTRIgine (LaMICtal) 100 mg tablet, Take 50 mg by mouth Currently tapering off of this medication  , Disp: , Rfl:     levothyroxine 125 mcg tablet, Take 1 tablet (125 mcg total) by mouth daily, Disp: 30 tablet, Rfl: 5    pantoprazole (PROTONIX) 40 mg tablet, Take 40 mg by mouth daily, Disp: , Rfl:     predniSONE 20 mg tablet, Take 4 tablets (80 mg total) by mouth 2 (two) times a day with meals (Patient taking differently: Take 60 mg by mouth 2 (two) times a day with meals ), Disp: 112 tablet, Rfl: 0    simvastatin (ZOCOR) 40 mg tablet, Take 40 mg by mouth, Disp: , Rfl:     SUMAtriptan (IMITREX) 50 mg tablet, Take 50 mg by mouth, Disp: , Rfl:     levothyroxine 50 mcg tablet, Take 100 mcg by mouth daily, Disp: , Rfl:     ondansetron (ZOFRAN) 4 mg tablet, Take 1 tablet (4 mg total) by mouth every 8 (eight) hours as needed for nausea or vomiting for up to 15 days, Disp: 20 tablet, Rfl: 5     ALLERGIES:  Allergies   Allergen Reactions    Chocolate - Food Allergy Other (See Comments)    Iodinated Diagnostic Agents Hives     CT contrast dye      Penicillins Hives    Sulfa Antibiotics Hives    Banana - Food Allergy Headache        ACTIVE PROBLEMS:  Patient Active Problem List   Diagnosis    Malignant melanoma of scalp [facility] [address] [facility]    Acquired hypothyroidism    Coronary arteriosclerosis    RBBB    Sarcoidosis    Seizures ([facility] [address] )    Adrenal insufficiency due to cancer therapy [facility])          PAST MEDICAL HISTORY:   Past Medical History:   Diagnosis Date    Migraines     Prostate cancer ([facility] [address] )     Sarcoidosis     Skin cancer         PAST SURGICAL HISTORY:  Past Surgical History:   Procedure Laterality Date    HERNIA REPAIR      PROSTATE SURGERY          SOCIAL HISTORY:  Social History     Socioeconomic History    Marital status: /Civil Union     Spouse name: None    Number of children: None    Years of education: None    Highest education level: None   Occupational History    None   Tobacco Use    Smoking status: Former Smoker    Smokeless tobacco: Never Used   Vaping Use    Vaping Use: Never used   Substance and Sexual Activity    Alcohol use: Not Currently    Drug use: Never    Sexual activity: None   Other Topics Concern    None   Social History Narrative    None     Social Determinants of Health     Financial Resource Strain:     Difficulty of Paying Living Expenses:    Food Insecurity:     Worried About Running Out of Food in the Last Year:     Ran Out of Food in the Last Year:    Transportation Needs:     Lack of Transportation (Medical):  Lack of Transportation (Non-Medical):    Physical Activity:     Days of Exercise per Week:     Minutes of Exercise per Session:    Stress:     Feeling of Stress :    Social Connections:     Frequency of Communication with Friends and Family:     Frequency of Social Gatherings with Friends and Family:     Attends Mosque Services:     Active Member of Clubs or Organizations:     Attends Club or Organization Meetings:     Marital Status:    Intimate Partner Violence:     Fear of Current or Ex-Partner:     Emotionally Abused:     Physically Abused:     Sexually Abused:         FAMILY HISTORY:  History reviewed  No pertinent family history  Objective    Objective    PHYSICAL EXAMINATION:   Blood pressure 114/72, pulse 94, temperature 98 °F (36 7 °C), temperature source Temporal, resp  rate 16, height 5' 9 1" (1 755 m), weight 71 2 kg (157 lb), SpO2 98 %  Physical Exam  Constitutional:       General: He is not in acute distress  Appearance: Normal appearance  He is not toxic-appearing     HENT:      Mouth/Throat:      Mouth: Mucous membranes are moist  Pharynx: Oropharynx is clear  Eyes:      General: No scleral icterus  Extraocular Movements: Extraocular movements intact  Conjunctiva/sclera: Conjunctivae normal    Cardiovascular:      Rate and Rhythm: Normal rate and regular rhythm  Pulses: Normal pulses  Heart sounds: No murmur heard  No friction rub  No gallop  Pulmonary:      Effort: Pulmonary effort is normal  No respiratory distress  Breath sounds: Normal breath sounds  No wheezing or rales  Abdominal:      General: There is no distension  Palpations: There is no mass  Tenderness: There is no abdominal tenderness  There is no rebound  Musculoskeletal:         General: No swelling or tenderness  Right lower leg: Edema (minimal/trace) present  Left lower leg: No edema  Lymphadenopathy:      Head:      Right side of head: No submandibular, preauricular or posterior auricular adenopathy  Left side of head: No submandibular, preauricular or posterior auricular adenopathy  Cervical: No cervical adenopathy  Right cervical: No superficial or posterior cervical adenopathy  Left cervical: No superficial or posterior cervical adenopathy  Upper Body:      Right upper body: No supraclavicular or axillary adenopathy  Left upper body: No supraclavicular or axillary adenopathy  Lower Body: No right inguinal adenopathy  No left inguinal adenopathy  Skin:     Findings: No rash  Comments: Well healed surgical scar  No evidence of recurrence at primary site  Neurological:      Mental Status: He is alert and oriented to person, place, and time  Cranial Nerves: No cranial nerve deficit  Coordination: Coordination abnormal (difficulty with finger from his nose to my finger, R worse than L; + tremor, but not intention tremor - constant)  Psychiatric:         Mood and Affect: Mood normal          Behavior: Behavior normal          Thought Content:  Thought content normal          Judgment: Judgment normal          I reviewed lab data in the chart  White Blood Cell Count   Date Value Ref Range Status   06/02/2021 5 8 3 4 - 10 8 x10E3/uL Final   04/28/2021 5 7 3 4 - 10 8 x10E3/uL Final   04/08/2021 5 0 3 4 - 10 8 x10E3/uL Final     Hemoglobin   Date Value Ref Range Status   06/02/2021 15 4 13 0 - 17 7 g/dL Final   04/28/2021 14 4 13 0 - 17 7 g/dL Final   04/08/2021 13 1 13 0 - 17 7 g/dL Final     Platelet Count   Date Value Ref Range Status   06/02/2021 364 150 - 450 x10E3/uL Final   04/28/2021 251 150 - 450 x10E3/uL Final   04/08/2021 205 150 - 450 x10E3/uL Final     MCV   Date Value Ref Range Status   06/02/2021 86 79 - 97 fL Final   04/28/2021 85 79 - 97 fL Final   04/08/2021 85 79 - 97 fL Final      Potassium   Date Value Ref Range Status   06/02/2021 4 8 3 5 - 5 2 mmol/L Final   04/28/2021 4 6 3 5 - 5 2 mmol/L Final   04/08/2021 4 3 3 5 - 5 2 mmol/L Final     Chloride   Date Value Ref Range Status   06/02/2021 91 (L) 96 - 106 mmol/L Final   04/28/2021 97 96 - 106 mmol/L Final   04/08/2021 100 96 - 106 mmol/L Final     CO2   Date Value Ref Range Status   06/02/2021 19 (L) 20 - 29 mmol/L Final   04/28/2021 23 20 - 29 mmol/L Final   04/08/2021 23 20 - 29 mmol/L Final     BUN   Date Value Ref Range Status   06/02/2021 21 8 - 27 mg/dL Final   04/28/2021 19 8 - 27 mg/dL Final   04/08/2021 15 8 - 27 mg/dL Final     Creatinine   Date Value Ref Range Status   06/02/2021 1 22 0 76 - 1 27 mg/dL Final   04/28/2021 1 22 0 76 - 1 27 mg/dL Final   04/08/2021 1 08 0 76 - 1 27 mg/dL Final     Glucose, Random   Date Value Ref Range Status   06/02/2021 154 (H) 65 - 99 mg/dL Final   04/28/2021 104 (H) 65 - 99 mg/dL Final   04/08/2021 100 (H) 65 - 99 mg/dL Final     eGFR    Date Value Ref Range Status   06/02/2021 68 >59 mL/min/1 73 Final     Comment:     **[facility] currently reports eGFR in compliance with the current**    recommendations of the [employer]  Janalee Apley will    update reporting as new guidelines are published from the NKF-ASN    Task force  2021 68 >59 mL/min/1 73 Final     Comment:     **[facility] currently reports eGFR in compliance with the current**    recommendations of the [employer]  Janalee Apley will  update   reporting as new guidelines are published from the NKF-ASN  Task force  2021 78 >59 mL/min/1 73 Final     Albumin   Date Value Ref Range Status   2021 4 2 3 7 - 4 7 g/dL Final   2021 4 2 3 7 - 4 7 g/dL Final   2021 3 8 3 7 - 4 7 g/dL Final     TOTAL BILIRUBIN   Date Value Ref Range Status   2021 0 5 0 0 - 1 2 mg/dL Final   2021 0 5 0 0 - 1 2 mg/dL Final   2021 0 4 0 0 - 1 2 mg/dL Final     AST   Date Value Ref Range Status   2021 12 0 - 40 IU/L Final   2021 17 0 - 40 IU/L Final   2021 16 0 - 40 IU/L Final     ALT   Date Value Ref Range Status   2021 10 0 - 44 IU/L Final   2021 13 0 - 44 IU/L Final   2021 12 0 - 44 IU/L Final      LDH   Date Value Ref Range Status   2021 119 (L) 121 - 224 IU/L Final   2021 141 121 - 224 IU/L Final   2021 172 121 - 224 IU/L Final     TSH   Date Value Ref Range Status   2021 0 272 (L) 0 450 - 4 500 uIU/mL Final   2021 11 300 (H) 0 450 - 4 500 uIU/mL Final   2021 7 580 (H) 0 450 - 4 500 uIU/mL Final     No results found for: R6SVUUM   No results found for: FREET4      RECENT IMAGIN2021 Brain MRI  several FLAIR hyperintensities in the periventricular and subcortical white matter, and T2/FLAIR hyperintensities seen within the leana  Included in the differential diagnosis is mild small-vessel ischemic disease    However because of the large amount of T2 FLAIR hyperintensity within the leana, included in the differential diagnosis central pontine myelinolysis, as well as a demyelinating process, and inflammatory process including Infectious etiology, and a metabolic process  Assessment    Assessment/Plan    Mr Jeane Pisano is a 66 yr male with Stage IIIC melanoma on treatment with pembrolizumab now with newly diagnosed adrenal insufficiency with tremors and concerning findings on brain MRI    Acquired hypothyroidism  Increased his levothyroxine dose at his last visit  Fatigue has improved, but I suspect this is a combination of increased levothyroxine dose as well as steroids for his recently diagnosed adrenal insufficiency  TSH is just below the lower limit of normal, and we will continue to monitor over time  If we need to adjust the levothyroxine further we will  If needed we will also refer him to an endocrinologist to help manage his hypothyroidism and new adrenal insufficiency  Sarcoidosis  Appears to be stable  Recent imaging demonstrates some underlying lung issues that have been seen on previous scans  Hard to compare across scans as one was PET and one was a CT scan  Will continue to monitor as well as consult with his sarcoidosis specialist as needed  Of note, his chronic dry cough has resolved since been on steroids  Adrenal insufficiency due to cancer therapy [facility])  Diagnosis since his last visit here  He had called with issues have having profound fatigue  A m  cortisol and ACTH were below lower limits of normal and his sodium was low, all consistent with the diagnosis of adrenal insufficiency  He was treated with high-dose steroids and improved  We are slowly tapering down his prednisone  Will continue to assess his fatigue as we decrease his prednisone dose  Plan to switch over to hydrocortisone 1 we get down to prednisone 10 mg daily  If needed, we can have him see an endocrinologist to help manage this as well as his hypothyroidism  He is currently on 60 mg of prednisone daily  We discussed that we will decrease his dose by 10 mg weekly  He will drop to 50 mg of prednisone on Monday June 21, 2021    I counseled him on getting a medical alert bracelet or necklace designating that he has adrenal insufficiency  Malignant melanoma of scalp ([facility] [address] )  Since his last visit, Mr devorah marsh was diagnosed with adrenal insufficiency  He had called in with worsening and profound fatigue not being able to get out of bed, short of breath, and just overall not feeling well  CT chest ruled out pneumonitis, echo rule out heart failure, and a m  cortisol and ACTH were low, indicative of adrenal insufficiency  He was started on high-dose steroids in the event that he did have pneumonitis while we were waiting for the CT scan results  He was then transitioned to prednisone daily 1 mg/kg and he is currently on 60 mg daily  We will continue with slow taper  He also endorsed having a new tremor in bilateral hands  He states this has gotten worse over the past couple months, and was actually improved on high-dose steroids  Brain MRI demonstrates possible mild small-vessel ischemia and differential diagnosis including central pontine myelinolysis lysis as well as a demyelinating process, and inflammatory process including Infectious etiology, and a metabolic process  We will have him follow with his neurologist, and I will call to speak to him directly about my concerns  Given the clear etiology of findings on his brain MRI, we will hold treatment today with pembrolizumab  I discussed that there is not really and concern with having to skip a dose of immunotherapy, because the T-cells are still activated in his body and fighting any signs of melanoma  We will reassess timing of administration of next dose of pembrolizumab  He is also due for repeat PET-CT at this time, given diffuse lymphadenopathy on his last scans, thought to be due to his recent COVID-19 vaccination  This repeat PET scan will confirm that this is the case  He will return in 4 weeks with scans and labs  All scripts provided to him      He and his wife know to call if there is any issues or concerns prior to his next visit  Return in about 4 weeks (around 7/15/2021)       Jarred Mc MD, PhD

## 2021-06-29 ENCOUNTER — TELEPHONE (OUTPATIENT)
Dept: HEMATOLOGY ONCOLOGY | Facility: CLINIC | Age: 71
End: 2021-06-29

## 2021-06-29 DIAGNOSIS — G25.2 COARSE TREMORS: Primary | ICD-10-CM

## 2021-06-29 NOTE — TELEPHONE ENCOUNTER
Brain MRI results are visible in care everywhere under [facility], performed on 06/16/21  Pt was seen in office with Dr René Manrique on 6/17/21 which results were reviewed with patient

## 2021-06-29 NOTE — TELEPHONE ENCOUNTER
Luzmaria Huertas from [facility] called stating they do not have MRI  Luzmaria Huertas stated this was done through [address] in [place]   For any questions please call Luzmaria Huertas at [phone]

## 2021-06-29 NOTE — PROGRESS NOTES
Patient with new onset tremors for 3-4 months and Brain MRI concerning for possible demyelinating process  Now with symptoms of hand/figner locking and pain  Received this message today      "Dr Alejandra Sim - I have developed a new symptom that I am concerned about  From time-to-time, my fingers lock up and will not move unless I bend them  It started with my right hand, little finger, but now happens with my index fingers on both hands  It's a little painful, until I release them  I have not yet heard back from Dr Najma Morales, the neurologist at [facility] I asked you to refer me to for an evaluation of my MRI, so, if you have a neurologist there at [place] [address] that you can get to see me, please go ahead and make a referral, just in case this thing with my fingers is a neurological problem        I am scheduled for a PET scan on July 8 and will see you on the July 15        Thank you,   Onofre Monroy"         Called and left message for Dr Rebecca Chaudhari office  Also placed order for referral to one of our neurologists, to help expedite work up    [facility] Mr Tomy Amaro to discuss with him as well      Juaquin Real MD, PhD

## 2021-07-07 DIAGNOSIS — R06.02 SHORTNESS OF BREATH: Primary | ICD-10-CM

## 2021-07-07 DIAGNOSIS — C43.4 MALIGNANT MELANOMA OF SCALP (HCC): ICD-10-CM

## 2021-07-07 RX ORDER — PREDNISONE 20 MG/1
20 TABLET ORAL 3 TIMES DAILY
Qty: 30 TABLET | Refills: 2 | Status: SHIPPED | OUTPATIENT
Start: 2021-07-07 | End: 2021-09-20

## 2021-07-09 ENCOUNTER — TELEPHONE (OUTPATIENT)
Dept: HEMATOLOGY ONCOLOGY | Facility: CLINIC | Age: 71
End: 2021-07-09

## 2021-07-09 ENCOUNTER — PATIENT MESSAGE (OUTPATIENT)
Dept: HEMATOLOGY ONCOLOGY | Facility: CLINIC | Age: 71
End: 2021-07-09

## 2021-07-09 NOTE — TELEPHONE ENCOUNTER
Called and spoke with patient  He states he was up all night vomiting/dry heaving  He is very fatigued, drifting in and out of sleep this morning  He had 1/2 a piece of toast this morning and 6 oz of coke  Patient has been taking 40 mg prednisone daily  Patient is aware Dr Royal Pack recommends he be evaluated in the ED  He will go to either [address] [place] [address] or [facility]

## 2021-07-15 ENCOUNTER — OFFICE VISIT (OUTPATIENT)
Dept: HEMATOLOGY ONCOLOGY | Facility: CLINIC | Age: 71
End: 2021-07-15
Payer: MEDICARE

## 2021-07-15 VITALS
HEIGHT: 69 IN | SYSTOLIC BLOOD PRESSURE: 104 MMHG | TEMPERATURE: 97.4 F | BODY MASS INDEX: 22.81 KG/M2 | WEIGHT: 154 LBS | DIASTOLIC BLOOD PRESSURE: 72 MMHG | OXYGEN SATURATION: 99 % | HEART RATE: 95 BPM | RESPIRATION RATE: 16 BRPM

## 2021-07-15 DIAGNOSIS — E27.3 ADRENAL INSUFFICIENCY DUE TO CANCER THERAPY (HCC): ICD-10-CM

## 2021-07-15 DIAGNOSIS — Z79.899 HIGH RISK MEDICATION USE: ICD-10-CM

## 2021-07-15 DIAGNOSIS — C43.4 MALIGNANT MELANOMA OF SCALP (HCC): Primary | ICD-10-CM

## 2021-07-15 DIAGNOSIS — E03.9 ACQUIRED HYPOTHYROIDISM: ICD-10-CM

## 2021-07-15 DIAGNOSIS — R11.0 NAUSEA: ICD-10-CM

## 2021-07-15 PROCEDURE — 99215 OFFICE O/P EST HI 40 MIN: CPT | Performed by: INTERNAL MEDICINE

## 2021-07-15 RX ORDER — PROCHLORPERAZINE MALEATE 10 MG
10 TABLET ORAL EVERY 6 HOURS PRN
Qty: 30 TABLET | Refills: 2 | Status: SHIPPED | OUTPATIENT
Start: 2021-07-15

## 2021-07-15 RX ORDER — FLUDROCORTISONE ACETATE 0.1 MG/1
0.1 TABLET ORAL EVERY MORNING
COMMUNITY
Start: 2021-07-14

## 2021-07-15 RX ORDER — ONDANSETRON 4 MG/1
4 TABLET, FILM COATED ORAL EVERY 8 HOURS PRN
Qty: 30 TABLET | Refills: 5 | Status: SHIPPED | OUTPATIENT
Start: 2021-07-15 | End: 2021-09-20

## 2021-07-15 NOTE — LETTER
July 15, 2021     Alexandra Langley MD  [address] [facility]  [address] [facility]    Patient: Bruce Fleming   YOB: 1950   Date of Visit: 7/15/2021       Dear Dr Jack Barker: Thank you for referring Milka West to me for evaluation  Below are my notes for this consultation  If you have questions, please do not hesitate to call me  I look forward to following your patient along with you  Sincerely,        Dyan Hammans, MD        CC: Daphney Dopp, MD Maybelle Razor, MD Carletta Clipper, MD Dyan Hammans, MD  7/15/2021  1:48 PM  Sign when Signing Visit  [address] [place] [address] [address]  [phone]  [phone]     Date of Visit: 7/15/2021  Name: Bruce Fleming   YOB: 1950     Subjective    Subjective    VISIT DIAGNOSIS:  Diagnoses and all orders for this visit:    Malignant melanoma of scalp ([facility] [address] )  -     ondansetron (ZOFRAN) 4 mg tablet; Take 1 tablet (4 mg total) by mouth every 8 (eight) hours as needed for nausea or vomiting    High risk medication use  -     prochlorperazine (COMPAZINE) 10 mg tablet; Take 1 tablet (10 mg total) by mouth every 6 (six) hours as needed for nausea or vomiting  -     Cancel: Ambulatory referral to Endocrinology; Future  -     ondansetron (ZOFRAN) 4 mg tablet; Take 1 tablet (4 mg total) by mouth every 8 (eight) hours as needed for nausea or vomiting  -     Ambulatory referral to Endocrinology; Future    Nausea  -     prochlorperazine (COMPAZINE) 10 mg tablet; Take 1 tablet (10 mg total) by mouth every 6 (six) hours as needed for nausea or vomiting  -     ondansetron (ZOFRAN) 4 mg tablet; Take 1 tablet (4 mg total) by mouth every 8 (eight) hours as needed for nausea or vomiting    Acquired hypothyroidism  -     Cancel: Ambulatory referral to Endocrinology; Future  -     Ambulatory referral to Endocrinology;  Future    Adrenal insufficiency due to cancer therapy [facility])  -     Cancel: Ambulatory referral to Endocrinology; Future  -     Ambulatory referral to Endocrinology; Future    Other orders  -     fludrocortisone (FLORINEF) 0 1 mg tablet; Take 0 1 mg by mouth every morning        Oncology History   Malignant melanoma of scalp ([facility] [address] )   5/15/2020 Initial Diagnosis    Malignant melanoma of scalp ([facility] [address] )     6/15/2020 -  Cancer Staged    Staging form: Melanoma of the Skin, AJCC 8th Edition  - Clinical stage from 6/15/2020: Stage IIB (cT3b, cN0, cM0) - Signed by Trung Wilson MD on 3/22/2021       10/26/2020 -  Cancer Staged    Staging form: Melanoma of the Skin, AJCC 8th Edition  - Pathologic stage from 10/26/2020: Stage IIIC (pT3b, pN1c, cM0) - Signed by Trung Wilson MD on 3/22/2021       11/2/2020 - 11/2/2020 Radiation    Performed at location closer to home    Radiation to scalp         12/28/2020 -  Chemotherapy    pembrolizumab (KEYTRUDA) 400 mg in sodium chloride 0 9 % 50 mL IVPB, 400 mg (200 % of original dose 200 mg), Intravenous, Once, 4 of 9 cycles  Dose modification: 400 mg (original dose 200 mg, Cycle 1, Reason: Other (Must fill in a comment), Comment: new 6 week dosing)  Administration: 400 mg (5/6/2021)        Cancer Staging  Malignant melanoma of scalp ([facility] [address] )  Staging form: Melanoma of the Skin, AJCC 8th Edition  - Clinical stage from 6/15/2020: Stage IIB (cT3b, cN0, cM0) - Signed by Trung Wilosn MD on 3/22/2021  - Pathologic stage from 10/26/2020: Stage IIIC (pT3b, pN1c, cM0) - Signed by Trung Wilson MD on 3/22/2021     Treatment Details   Treatment goal Curative   Plan Name OP Pembrolizumab Q 42 Days   Status Active   Start Date 12/28/2020   End Date 12/2/2021 (Planned)   Provider Trung Wilson MD   Chemotherapy pembrolizumab (KEYTRUDA) IVPB, 400 mg (200 % of original dose 200 mg), Intravenous, Once, 4 of 9 cycles  Dose modification: 400 mg (original dose 200 mg, Cycle 1, Reason: Other (Must fill in a comment), Comment: new 6 week dosing)  Administration: 400 mg (5/6/2021)          HISTORY OF PRESENT ILLNESS: Chris Julien is a 66 y o  male  who has 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 treatment with pembrolizumab here for follow up  Since his last visit, he had initially responded to increased does of prednisone, but had worsened fatigue as prednisone was tapered - and was on 30 mg daily  He had profound fatigue and was not eating or drinking much  Had also developed nausea, which was unrelieved by taking ondansetron PRN  He had decreased fluid intake and ended up needing to slump down on to the bathroom floor the night before he went to the ER on 7/9/2021 because he was so weak  He received stress dose steroids and fluids in the hospital and felt much better at the time of his discharge on Tuesday July 13, 2021  He had intermittent low, asymptomatic BPs, and was started on fludricortisone as well  I had spoken with his PCP,Dr Brijesh Gonzalez  He continues on 30 mg prednisone daily at this time  He is feeling better, but still is fatigued and deconditioned  He was evaluated by PT in the hospital and was given some exercises to do at home  He is eating and drinking more, and is trying to consume more protein  He does have constipation  He did see his dermatologist within the last two to three weeks and no concerning lesions at that time  He does point out a small patch of dry, scaly skin on his left mid thigh today, otherwise no new, changing, or concerning lesions today  He is having blurry vision, and hasn't seen ophtho for a few years  Denies headaches, rash, itching and diarrhea  REVIEW OF SYSTEMS:  Review of Systems   Constitutional: Positive for appetite change, fatigue and unexpected weight change  Negative for fever  HENT:   Positive for hearing loss (wears hearing aids) and voice change  Negative for lump/mass  Eyes: Positive for eye problems (blurry vision)  Negative for icterus     Respiratory: Negative for cough, shortness of breath and wheezing  Cardiovascular: Negative for leg swelling  Gastrointestinal: Positive for constipation  Negative for abdominal pain, diarrhea, nausea and vomiting  Genitourinary: Negative for difficulty urinating and hematuria  Musculoskeletal: Negative for arthralgias, gait problem and myalgias  Skin: Negative for itching and rash  New dry, scaly patch on mid thigh  Otherwise, no additional new, changing, or concerning lesions  Neurological: Negative for extremity weakness, gait problem, headaches, light-headedness and numbness  Hematological: Negative for adenopathy  MEDICATIONS:    Current Outpatient Medications:     Aspirin Buf,CaCarb-MgCarb-MgO, 81 MG TABS, Take 81 mg by mouth, Disp: , Rfl:     benzonatate (TESSALON PERLES) 100 mg capsule, Take 100 mg by mouth 3 (three) times a day as needed for cough, Disp: , Rfl:     betamethasone dipropionate (DIPROSONE) 0 05 % cream, Apply topically 2 (two) times a day, Disp: , Rfl:     fludrocortisone (FLORINEF) 0 1 mg tablet, Take 0 1 mg by mouth every morning, Disp: , Rfl:     gemfibrozil (LOPID) 600 mg tablet, Take 600 mg by mouth, Disp: , Rfl:     levothyroxine 125 mcg tablet, Take 1 tablet (125 mcg total) by mouth daily, Disp: 30 tablet, Rfl: 5    pantoprazole (PROTONIX) 40 mg tablet, Take 40 mg by mouth daily, Disp: , Rfl:     predniSONE 20 mg tablet, Take 1 tablet (20 mg total) by mouth 3 (three) times a day (Patient taking differently: Take 30 mg by mouth 3 (three) times a day ), Disp: 30 tablet, Rfl: 2    simvastatin (ZOCOR) 40 mg tablet, Take 40 mg by mouth, Disp: , Rfl:     SUMAtriptan (IMITREX) 50 mg tablet, Take 50 mg by mouth, Disp: , Rfl:     lamoTRIgine (LaMICtal) 100 mg tablet, Take 50 mg by mouth Currently tapering off of this medication  , Disp: , Rfl:     levothyroxine 50 mcg tablet, Take 100 mcg by mouth daily, Disp: , Rfl:     ondansetron (ZOFRAN) 4 mg tablet, Take 1 tablet (4 mg total) by mouth every 8 (eight) hours as needed for nausea or vomiting, Disp: 30 tablet, Rfl: 5    prochlorperazine (COMPAZINE) 10 mg tablet, Take 1 tablet (10 mg total) by mouth every 6 (six) hours as needed for nausea or vomiting, Disp: 30 tablet, Rfl: 2     ALLERGIES:  Allergies   Allergen Reactions    Chocolate - Food Allergy Other (See Comments)    Iodinated Diagnostic Agents Hives     CT contrast dye      Penicillins Hives    Sulfa Antibiotics Hives    Banana - Food Allergy Headache        ACTIVE PROBLEMS:  Patient Active Problem List   Diagnosis    Mal[facility] ([facility] )    Acquired hypothyroidism    Coronary arteriosclerosis    RBBB    Sarcoidosis    Seizures ([place] [address] )    Adrenal insufficiency due to cancer therapy ([place] [address] )    High risk medication use    Nausea          PAST MEDICAL HISTORY:   Past Medical History:   Diagnosis Date    Migraines     Prostate cancer ([place] [address] )     Sarcoidosis     Skin cancer         PAST SURGICAL HISTORY:  Past Surgical History:   Procedure Laterality Date    HERNIA REPAIR      PROSTATE SURGERY          SOCIAL HISTORY:  Social History     Socioeconomic History    Marital status: /Civil Union     Spouse name: None    Number of children: None    Years of education: None    Highest education level: None   Occupational History    None   Tobacco Use    Smoking status: Former Smoker    Smokeless tobacco: Never Used   Vaping Use    Vaping Use: Never used   Substance and Sexual Activity    Alcohol use: Not Currently    Drug use: Never    Sexual activity: None   Other Topics Concern    None   Social History Narrative    None     Social Determinants of Health     Financial Resource Strain:     Difficulty of Paying Living Expenses:    Food Insecurity:     Worried About Running Out of Food in the Last Year:     Ran Out of Food in the Last Year:    Transportation Needs:     Lack of Transportation (Medical):      Lack of Transportation (Non-Medical):    Physical Activity:     Days of Exercise per Week:     Minutes of Exercise per Session:    Stress:     Feeling of Stress :    Social Connections:     Frequency of Communication with Friends and Family:     Frequency of Social Gatherings with Friends and Family:     Attends Yarsanism Services:     Active Member of Clubs or Organizations:     Attends Club or Organization Meetings:     Marital Status:    Intimate Partner Violence:     Fear of Current or Ex-Partner:     Emotionally Abused:     Physically Abused:     Sexually Abused:         FAMILY HISTORY:  History reviewed  No pertinent family history  Objective    Objective    PHYSICAL EXAMINATION:   Blood pressure 104/72, pulse 95, temperature (!) 97 4 °F (36 3 °C), temperature source Temporal, resp  rate 16, height 5' 9 1" (1 755 m), weight 69 9 kg (154 lb), SpO2 99 %  ECOG Performance Status      Most Recent Value   ECOG Performance Status  1 - Restricted in physically strenuous activity but ambulatory and able to carry out work of a light or sedentary nature, e g , light house work, office work             Physical Exam  Constitutional:       General: He is not in acute distress  Appearance: Normal appearance  He is not toxic-appearing  HENT:      Head: Normocephalic  Comments: No evidence of recurrence on the scalp around the scar/skin graft  Mouth/Throat:      Mouth: Mucous membranes are moist       Pharynx: Oropharynx is clear  Eyes:      General: No scleral icterus  Cardiovascular:      Rate and Rhythm: Normal rate and regular rhythm  Pulses: Normal pulses  Heart sounds: No murmur heard  No friction rub  No gallop  Pulmonary:      Effort: Pulmonary effort is normal  No respiratory distress  Breath sounds: Normal breath sounds  No wheezing or rales  Abdominal:      General: There is no distension  Palpations: There is no mass  Tenderness: There is no abdominal tenderness  There is no rebound  Musculoskeletal:         General: No swelling or tenderness  Right lower leg: No edema  Left lower leg: No edema  Lymphadenopathy:      Head:      Right side of head: No submandibular, preauricular or posterior auricular adenopathy  Left side of head: No submandibular, preauricular or posterior auricular adenopathy  Cervical: No cervical adenopathy  Right cervical: No superficial or posterior cervical adenopathy  Left cervical: No superficial or posterior cervical adenopathy  Upper Body:      Right upper body: No supraclavicular or axillary adenopathy  Left upper body: No supraclavicular or axillary adenopathy  Lower Body: No right inguinal adenopathy  No left inguinal adenopathy  Skin:     Findings: No rash  Comments: Well healed surgical scar  No evidence of recurrence at primary site  Neurological:      General: No focal deficit present  Mental Status: He is alert and oriented to person, place, and time  Psychiatric:         Mood and Affect: Mood normal          Behavior: Behavior normal          Thought Content: Thought content normal          Judgment: Judgment normal          I reviewed lab data in the chart      White Blood Cell Count   Date Value Ref Range Status   06/15/2021 10 8 3 4 - 10 8 x10E3/uL Final   06/02/2021 5 8 3 4 - 10 8 x10E3/uL Final   04/28/2021 5 7 3 4 - 10 8 x10E3/uL Final     Hemoglobin   Date Value Ref Range Status   06/15/2021 15 2 13 0 - 17 7 g/dL Final   06/02/2021 15 4 13 0 - 17 7 g/dL Final   04/28/2021 14 4 13 0 - 17 7 g/dL Final     Platelet Count   Date Value Ref Range Status   06/15/2021 267 150 - 450 x10E3/uL Final   06/02/2021 364 150 - 450 x10E3/uL Final   04/28/2021 251 150 - 450 x10E3/uL Final     MCV   Date Value Ref Range Status   06/15/2021 86 79 - 97 fL Final   06/02/2021 86 79 - 97 fL Final   04/28/2021 85 79 - 97 fL Final      Potassium   Date Value Ref Range Status   06/15/2021 4 9 3 5 - 5 2 mmol/L Final   06/02/2021 4 8 3 5 - 5 2 mmol/L Final   04/28/2021 4 6 3 5 - 5 2 mmol/L Final     Chloride   Date Value Ref Range Status   06/15/2021 97 96 - 106 mmol/L Final   06/02/2021 91 (L) 96 - 106 mmol/L Final   04/28/2021 97 96 - 106 mmol/L Final     CO2   Date Value Ref Range Status   06/15/2021 21 20 - 29 mmol/L Final   06/02/2021 19 (L) 20 - 29 mmol/L Final   04/28/2021 23 20 - 29 mmol/L Final     BUN   Date Value Ref Range Status   06/15/2021 30 (H) 8 - 27 mg/dL Final   06/02/2021 21 8 - 27 mg/dL Final   04/28/2021 19 8 - 27 mg/dL Final     Creatinine   Date Value Ref Range Status   06/15/2021 1 16 0 76 - 1 27 mg/dL Final   06/02/2021 1 22 0 76 - 1 27 mg/dL Final   04/28/2021 1 22 0 76 - 1 27 mg/dL Final     Glucose, Random   Date Value Ref Range Status   06/15/2021 127 (H) 65 - 99 mg/dL Final   06/02/2021 154 (H) 65 - 99 mg/dL Final   04/28/2021 104 (H) 65 - 99 mg/dL Final     eGFR    Date Value Ref Range Status   06/15/2021 72 >59 mL/min/1 73 Final     Comment:     **[facility] currently reports eGFR in compliance with the current**    recommendations of the [employer]  [facility] will    update reporting as new guidelines are published from the NKF-ASN    Task force  06/02/2021 68 >59 mL/min/1 73 Final     Comment:     **[facility] currently reports eGFR in compliance with the current**    recommendations of the [employer]  [facility] will    update reporting as new guidelines are published from the NKF-ASN    Task force  04/28/2021 68 >59 mL/min/1 73 Final     Comment:     **[facility] currently reports eGFR in compliance with the current**    recommendations of the [employer]  [facility] will  update   reporting as new guidelines are published from the NKF-ASN  Task force         Albumin   Date Value Ref Range Status   06/15/2021 4 0 3 7 - 4 7 g/dL Final   06/02/2021 4 2 3 7 - 4 7 g/dL Final   04/28/2021 4 2 3 7 - 4 7 g/dL Final     TOTAL BILIRUBIN   Date Value Ref Range Status   06/15/2021 0 5 0 0 - 1 2 mg/dL Final   06/02/2021 0 5 0 0 - 1 2 mg/dL Final   04/28/2021 0 5 0 0 - 1 2 mg/dL Final     AST   Date Value Ref Range Status   06/15/2021 11 0 - 40 IU/L Final   06/02/2021 12 0 - 40 IU/L Final   04/28/2021 17 0 - 40 IU/L Final     ALT   Date Value Ref Range Status   06/15/2021 17 0 - 44 IU/L Final   06/02/2021 10 0 - 44 IU/L Final   04/28/2021 13 0 - 44 IU/L Final      LDH   Date Value Ref Range Status   06/15/2021 129 121 - 224 IU/L Final   06/02/2021 119 (L) 121 - 224 IU/L Final   04/28/2021 141 121 - 224 IU/L Final     TSH   Date Value Ref Range Status   06/15/2021 0 233 (L) 0 450 - 4 500 uIU/mL Final   06/02/2021 0 272 (L) 0 450 - 4 500 uIU/mL Final   04/28/2021 11 300 (H) 0 450 - 4 500 uIU/mL Final     No results found for: A4PUKGQ   No results found for: FREET4      RECENT IMAGING:  No results found  Assessment    Assessment/Plan  Mr Manuel Noe is a 66 yr male with 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 pembrolizumab with immune mediated complications - hypothyroidism and adrenal insufficiency - with continued fatigue here for follow up  Malignant melanoma of scalp (HCC)  Overall, no clinical evidence of recurrence  Labs from hospitalization reviewed and ok  No TSH, T3, T4, LDH  He is due for PET scan - had to move due to hospitalization and now scheduled for Aug 3  We did discuss continuation of treatment  We discussed that with endocrine side effects, once the gland is damaged, it is unlikely to return  Therefore, it is unlikely that we would do further harm when we continue treatment  We are currently supplementing with appropriate hormone replacement  We will also send him for consultation with endocrinology  He will get labs and will continue with scheduled treatment on July 29, 2021  We will assess TSH at that time and adjust levothyroxine as necessary        He knows to call with any issues or concerns prior to his next visit  High risk medication use  He is on immunotherapy and his having endocrine complications that we will continuously monitor  We will also have him see endocrinology  Will monior with each treatment, so labs every 3- 6 weeks  Nausea  Intermittent and not constant  Not really relieved by ondansetron  Will add compazine 10 mg  Told him to take the compazine as needed for acute nausea and then start ondansetron for a few days every 8 hours preventatively and can continue compazine     Acquired hypothyroidism  Had recently increased his levothyroxine  No TSH with labs from the hospital   He will get labs, including TSH, and we will adjust levothyroxine dosing as needed  We are going to have him see endocrinology as well    Adrenal insufficiency due to cancer therapy ([address] )  On higher dose prednisone which should cover physiologic doses of glucocorticoids, but he is still fatigued  Possibly due to deconditioning vs other hormones in the HPA axis, like testosterone  He will try to do the exercises PT recommended and build up his stamina  We are referring him to endocrinology to help evaluate his continued fatigue in presence of supplemented hypothyroidism and adrenal insufficiency  Return in about 4 weeks (around 8/12/2021) for office visit 8/12/2021 and infusion on 9/9/2021       Erin Avalos MD, PhD

## 2021-07-15 NOTE — PROGRESS NOTES
[facility] HEMATOLOGY ONCOLOGY SPECIALISTS [place]  [address] [facility] [address]  [phone]  [phone]     Date of Visit: 7/15/2021  Name: Gamaliel Puente   YOB: 1950     Subjective    Subjective    VISIT DIAGNOSIS:  Diagnoses and all orders for this visit:    Malignant melanoma [facility] scalp ([facility] )  -     ondansetron (ZOFRAN) 4 mg tablet; Take 1 tablet (4 mg total) by mouth every 8 (eight) hours as needed for nausea or vomiting    High risk medication use  -     prochlorperazine (COMPAZINE) 10 mg tablet; Take 1 tablet (10 mg total) by mouth every 6 (six) hours as needed for nausea or vomiting  -     Cancel: Ambulatory referral to Endocrinology; Future  -     ondansetron (ZOFRAN) 4 mg tablet; Take 1 tablet (4 mg total) by mouth every 8 (eight) hours as needed for nausea or vomiting  -     Ambulatory referral to Endocrinology; Future    Nausea  -     prochlorperazine (COMPAZINE) 10 mg tablet; Take 1 tablet (10 mg total) by mouth every 6 (six) hours as needed for nausea or vomiting  -     ondansetron (ZOFRAN) 4 mg tablet; Take 1 tablet (4 mg total) by mouth every 8 (eight) hours as needed for nausea or vomiting    Acquired hypothyroidism  -     Cancel: Ambulatory referral to Endocrinology; Future  -     Ambulatory referral to Endocrinology; Future    Adrenal insufficiency due to cancer therapy ([facility] )  -     Cancel: Ambulatory referral to Endocrinology; Future  -     Ambulatory referral to Endocrinology; Future    Other orders  -     fludrocortisone (FLORINEF) 0 1 mg tablet;  Take 0 1 mg by mouth every morning        Oncology History   Malignant melanoma of scalp ([facility] )   5/15/2020 Initial Diagnosis    Malignant melanoma of scalp ([facility] )     6/15/2020 -  Cancer Staged    Staging form: Melanoma of the Skin, AJCC 8th Edition  - Clinical stage from 6/15/2020: Stage IIB (cT3b, cN0, cM0) - Signed by David Chester MD on 3/22/2021       10/26/2020 -  Cancer Staged    Staging form: Melanoma of the Skin, AJCC 8th Edition  - Pathologic stage from 10/26/2020: Stage IIIC (pT3b, pN1c, cM0) - Signed by Obey Sherwood MD on 3/22/2021       11/2/2020 - 11/2/2020 Radiation    Performed at location closer to home  Radiation to scalp         12/28/2020 -  Chemotherapy    pembrolizumab (KEYTRUDA) 400 mg in sodium chloride 0 9 % 50 mL IVPB, 400 mg (200 % of original dose 200 mg), Intravenous, Once, 4 of 9 cycles  Dose modification: 400 mg (original dose 200 mg, Cycle 1, Reason: Other (Must fill in a comment), Comment: new 6 week dosing)  Administration: 400 mg (5/6/2021)        Cancer Staging  Malignant melanoma of scalp ([facility] )  Staging form: Melanoma of the Skin, AJCC 8th Edition  - Clinical stage from 6/15/2020: Stage IIB (cT3b, cN0, cM0) - Signed by Obey Sherwood MD on 3/22/2021  - Pathologic stage from 10/26/2020: Stage IIIC (pT3b, pN1c, cM0) - Signed by Obey Sherwood MD on 3/22/2021     Treatment Details   Treatment goal Curative   Plan Name OP Pembrolizumab Q 42 Days   Status Active   Start Date 12/28/2020   End Date 12/2/2021 (Planned)   Provider Obey Sherwood MD   Chemotherapy pembrolizumab (KEYTRUDA) IVPB, 400 mg (200 % of original dose 200 mg), Intravenous, Once, 4 of 9 cycles  Dose modification: 400 mg (original dose 200 mg, Cycle 1, Reason: Other (Must fill in a comment), Comment: new 6 week dosing)  Administration: 400 mg (5/6/2021)          HISTORY OF PRESENT ILLNESS: Raeanne Hodgkin is a 66 y o  male  who has 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 treatment with pembrolizumab here for follow up  Since his last visit, he had initially responded to increased does of prednisone, but had worsened fatigue as prednisone was tapered - and was on 30 mg daily  He had profound fatigue and was not eating or drinking much  Had also developed nausea, which was unrelieved by taking ondansetron PRN    He had decreased fluid intake and ended up needing to slump down on to the bathroom floor the night before he went to the ER on 7/9/2021 because he was so weak  He received stress dose steroids and fluids in the hospital and felt much better at the time of his discharge on Tuesday July 13, 2021  He had intermittent low, asymptomatic BPs, and was started on fludricortisone as well  I had spoken with his PCP,Dr Royal Dean  He continues on 30 mg prednisone daily at this time  He is feeling better, but still is fatigued and deconditioned  He was evaluated by PT in the hospital and was given some exercises to do at home  He is eating and drinking more, and is trying to consume more protein  He does have constipation  He did see his dermatologist within the last two to three weeks and no concerning lesions at that time  He does point out a small patch of dry, scaly skin on his left mid thigh today, otherwise no new, changing, or concerning lesions today  He is having blurry vision, and hasn't seen ophtho for a few years  Denies headaches, rash, itching and diarrhea  REVIEW OF SYSTEMS:  Review of Systems   Constitutional: Positive for appetite change, fatigue and unexpected weight change  Negative for fever  HENT:   Positive for hearing loss (wears hearing aids) and voice change  Negative for lump/mass  Eyes: Positive for eye problems (blurry vision)  Negative for icterus  Respiratory: Negative for cough, shortness of breath and wheezing  Cardiovascular: Negative for leg swelling  Gastrointestinal: Positive for constipation  Negative for abdominal pain, diarrhea, nausea and vomiting  Genitourinary: Negative for difficulty urinating and hematuria  Musculoskeletal: Negative for arthralgias, gait problem and myalgias  Skin: Negative for itching and rash  New dry, scaly patch on mid thigh  Otherwise, no additional new, changing, or concerning lesions  Neurological: Negative for extremity weakness, gait problem, headaches, light-headedness and numbness     Hematological: Negative for adenopathy  MEDICATIONS:    Current Outpatient Medications:     Aspirin Buf,CaCarb-MgCarb-MgO, 81 MG TABS, Take 81 mg by mouth, Disp: , Rfl:     benzonatate (TESSALON PERLES) 100 mg capsule, Take 100 mg by mouth 3 (three) times a day as needed for cough, Disp: , Rfl:     betamethasone dipropionate (DIPROSONE) 0 05 % cream, Apply topically 2 (two) times a day, Disp: , Rfl:     fludrocortisone (FLORINEF) 0 1 mg tablet, Take 0 1 mg by mouth every morning, Disp: , Rfl:     gemfibrozil (LOPID) 600 mg tablet, Take 600 mg by mouth, Disp: , Rfl:     levothyroxine 125 mcg tablet, Take 1 tablet (125 mcg total) by mouth daily, Disp: 30 tablet, Rfl: 5    pantoprazole (PROTONIX) 40 mg tablet, Take 40 mg by mouth daily, Disp: , Rfl:     predniSONE 20 mg tablet, Take 1 tablet (20 mg total) by mouth 3 (three) times a day (Patient taking differently: Take 30 mg by mouth 3 (three) times a day ), Disp: 30 tablet, Rfl: 2    simvastatin (ZOCOR) 40 mg tablet, Take 40 mg by mouth, Disp: , Rfl:     SUMAtriptan (IMITREX) 50 mg tablet, Take 50 mg by mouth, Disp: , Rfl:     lamoTRIgine (LaMICtal) 100 mg tablet, Take 50 mg by mouth Currently tapering off of this medication  , Disp: , Rfl:     levothyroxine 50 mcg tablet, Take 100 mcg by mouth daily, Disp: , Rfl:     ondansetron (ZOFRAN) 4 mg tablet, Take 1 tablet (4 mg total) by mouth every 8 (eight) hours as needed for nausea or vomiting, Disp: 30 tablet, Rfl: 5    prochlorperazine (COMPAZINE) 10 mg tablet, Take 1 tablet (10 mg total) by mouth every 6 (six) hours as needed for nausea or vomiting, Disp: 30 tablet, Rfl: 2     ALLERGIES:  Allergies   Allergen Reactions    Chocolate - Food Allergy Other (See Comments)    Iodinated Diagnostic Agents Hives     CT contrast dye      Penicillins Hives    Sulfa Antibiotics Hives    Banana - Food Allergy Headache        ACTIVE PROBLEMS:  Patient Active Problem List   Diagnosis    Malignant melanoma of scalp ([facility] )  Acquired hypothyroidism    Coronary arteriosclerosis    RBBB    Sarcoidosis    Seizures (HCC)    Adrenal insufficiency due to cancer therapy ([facility] )    High risk medication use    Nausea          PAST MEDICAL HISTORY:   Past Medical History:   Diagnosis Date    Migraines     Prostate cancer ([facility] )     Sarcoidosis     Skin cancer         PAST SURGICAL HISTORY:  Past Surgical History:   Procedure Laterality Date    HERNIA REPAIR      PROSTATE SURGERY          SOCIAL HISTORY:  Social History     Socioeconomic History    Marital status: /Civil Union     Spouse name: None    Number of children: None    Years of education: None    Highest education level: None   Occupational History    None   Tobacco Use    Smoking status: Former Smoker    Smokeless tobacco: Never Used   Vaping Use    Vaping Use: Never used   Substance and Sexual Activity    Alcohol use: Not Currently    Drug use: Never    Sexual activity: None   Other Topics Concern    None   Social History Narrative    None     Social Determinants of Health     Financial Resource Strain:     Difficulty of Paying Living Expenses:    Food Insecurity:     Worried About Running Out of Food in the Last Year:     Ran Out of Food in the Last Year:    Transportation Needs:     Lack of Transportation (Medical):  Lack of Transportation (Non-Medical):    Physical Activity:     Days of Exercise per Week:     Minutes of Exercise per Session:    Stress:     Feeling of Stress :    Social Connections:     Frequency of Communication with Friends and Family:     Frequency of Social Gatherings with Friends and Family:     Attends Congregation Services:     Active Member of Clubs or Organizations:     Attends Club or Organization Meetings:     Marital Status:    Intimate Partner Violence:     Fear of Current or Ex-Partner:     Emotionally Abused:     Physically Abused:     Sexually Abused:         FAMILY HISTORY:  History reviewed   No pertinent family history  Objective    Objective    PHYSICAL EXAMINATION:   Blood pressure 104/72, pulse 95, temperature (!) 97 4 °F (36 3 °C), temperature source Temporal, resp  rate 16, height 5' 9 1" (1 755 m), weight 69 9 kg (154 lb), SpO2 99 %  ECOG Performance Status      Most Recent Value   ECOG Performance Status  1 - Restricted in physically strenuous activity but ambulatory and able to carry out work of a light or sedentary nature, e g , light house work, office work             Physical Exam  Constitutional:       General: He is not in acute distress  Appearance: Normal appearance  He is not toxic-appearing  HENT:      Head: Normocephalic  Comments: No evidence of recurrence on the scalp around the scar/skin graft  Mouth/Throat:      Mouth: Mucous membranes are moist       Pharynx: Oropharynx is clear  Eyes:      General: No scleral icterus  Cardiovascular:      Rate and Rhythm: Normal rate and regular rhythm  Pulses: Normal pulses  Heart sounds: No murmur heard  No friction rub  No gallop  Pulmonary:      Effort: Pulmonary effort is normal  No respiratory distress  Breath sounds: Normal breath sounds  No wheezing or rales  Abdominal:      General: There is no distension  Palpations: There is no mass  Tenderness: There is no abdominal tenderness  There is no rebound  Musculoskeletal:         General: No swelling or tenderness  Right lower leg: No edema  Left lower leg: No edema  Lymphadenopathy:      Head:      Right side of head: No submandibular, preauricular or posterior auricular adenopathy  Left side of head: No submandibular, preauricular or posterior auricular adenopathy  Cervical: No cervical adenopathy  Right cervical: No superficial or posterior cervical adenopathy  Left cervical: No superficial or posterior cervical adenopathy        Upper Body:      Right upper body: No supraclavicular or axillary adenopathy  Left upper body: No supraclavicular or axillary adenopathy  Lower Body: No right inguinal adenopathy  No left inguinal adenopathy  Skin:     Findings: No rash  Comments: Well healed surgical scar  No evidence of recurrence at primary site  Neurological:      General: No focal deficit present  Mental Status: He is alert and oriented to person, place, and time  Psychiatric:         Mood and Affect: Mood normal          Behavior: Behavior normal          Thought Content: Thought content normal          Judgment: Judgment normal          I reviewed lab data in the chart      White Blood Cell Count   Date Value Ref Range Status   06/15/2021 10 8 3 4 - 10 8 x10E3/uL Final   06/02/2021 5 8 3 4 - 10 8 x10E3/uL Final   04/28/2021 5 7 3 4 - 10 8 x10E3/uL Final     Hemoglobin   Date Value Ref Range Status   06/15/2021 15 2 13 0 - 17 7 g/dL Final   06/02/2021 15 4 13 0 - 17 7 g/dL Final   04/28/2021 14 4 13 0 - 17 7 g/dL Final     Platelet Count   Date Value Ref Range Status   06/15/2021 267 150 - 450 x10E3/uL Final   06/02/2021 364 150 - 450 x10E3/uL Final   04/28/2021 251 150 - 450 x10E3/uL Final     MCV   Date Value Ref Range Status   06/15/2021 86 79 - 97 fL Final   06/02/2021 86 79 - 97 fL Final   04/28/2021 85 79 - 97 fL Final      Potassium   Date Value Ref Range Status   06/15/2021 4 9 3 5 - 5 2 mmol/L Final   06/02/2021 4 8 3 5 - 5 2 mmol/L Final   04/28/2021 4 6 3 5 - 5 2 mmol/L Final     Chloride   Date Value Ref Range Status   06/15/2021 97 96 - 106 mmol/L Final   06/02/2021 91 (L) 96 - 106 mmol/L Final   04/28/2021 97 96 - 106 mmol/L Final     CO2   Date Value Ref Range Status   06/15/2021 21 20 - 29 mmol/L Final   06/02/2021 19 (L) 20 - 29 mmol/L Final   04/28/2021 23 20 - 29 mmol/L Final     BUN   Date Value Ref Range Status   06/15/2021 30 (H) 8 - 27 mg/dL Final   06/02/2021 21 8 - 27 mg/dL Final   04/28/2021 19 8 - 27 mg/dL Final     Creatinine   Date Value Ref Range Status 06/15/2021 1 16 0 76 - 1 27 mg/dL Final   06/02/2021 1 22 0 76 - 1 27 mg/dL Final   04/28/2021 1 22 0 76 - 1 27 mg/dL Final     Glucose, Random   Date Value Ref Range Status   06/15/2021 127 (H) 65 - 99 mg/dL Final   06/02/2021 154 (H) 65 - 99 mg/dL Final   04/28/2021 104 (H) 65 - 99 mg/dL Final     eGFR    Date Value Ref Range Status   06/15/2021 72 >59 mL/min/1 73 Final     Comment:     **[facility] currently reports eGFR in compliance with the current**    recommendations of the [employer]  [place] will    update reporting as new guidelines are published from the NKF-ASN    Task force  06/02/2021 68 >59 mL/min/1 73 Final     Comment:     **[facility] currently reports eGFR in compliance with the current**    recommendations of the [employer]  [place] will    update reporting as new guidelines are published from the NKF-ASN    Task force  04/28/2021 68 >59 mL/min/1 73 Final     Comment:     **[facility] currently reports eGFR in compliance with the current**    recommendations of the [employer]  [place] will  update   reporting as new guidelines are published from the NKF-ASN  Task force         Albumin   Date Value Ref Range Status   06/15/2021 4 0 3 7 - 4 7 g/dL Final   06/02/2021 4 2 3 7 - 4 7 g/dL Final   04/28/2021 4 2 3 7 - 4 7 g/dL Final     TOTAL BILIRUBIN   Date Value Ref Range Status   06/15/2021 0 5 0 0 - 1 2 mg/dL Final   06/02/2021 0 5 0 0 - 1 2 mg/dL Final   04/28/2021 0 5 0 0 - 1 2 mg/dL Final     AST   Date Value Ref Range Status   06/15/2021 11 0 - 40 IU/L Final   06/02/2021 12 0 - 40 IU/L Final   04/28/2021 17 0 - 40 IU/L Final     ALT   Date Value Ref Range Status   06/15/2021 17 0 - 44 IU/L Final   06/02/2021 10 0 - 44 IU/L Final   04/28/2021 13 0 - 44 IU/L Final      LDH   Date Value Ref Range Status   06/15/2021 129 121 - 224 IU/L Final   06/02/2021 119 (L) 121 - 224 IU/L Final   04/28/2021 141 121 - 224 IU/L Final     TSH   Date Value Ref Range Status   06/15/2021 0 233 (L) 0 450 - 4 500 uIU/mL Final   06/02/2021 0 272 (L) 0 450 - 4 500 uIU/mL Final   04/28/2021 11 300 (H) 0 450 - 4 500 uIU/mL Final     No results found for: X9HKBZA   No results found for: FREET4      RECENT IMAGING:  No results found  Assessment    Assessment/Plan  Mr Ai Velazquez is a 66 yr male with 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 pembrolizumab with immune mediated complications - hypothyroidism and adrenal insufficiency - with continued fatigue here for follow up  Malignant melanoma of scalp (HCC)  Overall, no clinical evidence of recurrence  Labs from hospitalization reviewed and ok  No TSH, T3, T4, LDH  He is due for PET scan - had to move due to hospitalization and now scheduled for Aug 3  We did discuss continuation of treatment  We discussed that with endocrine side effects, once the gland is damaged, it is unlikely to return  Therefore, it is unlikely that we would do further harm when we continue treatment  We are currently supplementing with appropriate hormone replacement  We will also send him for consultation with endocrinology  He will get labs and will continue with scheduled treatment on July 29, 2021  We will assess TSH at that time and adjust levothyroxine as necessary  He knows to call with any issues or concerns prior to his next visit  High risk medication use  He is on immunotherapy and his having endocrine complications that we will continuously monitor  We will also have him see endocrinology  Will monior with each treatment, so labs every 3- 6 weeks  Nausea  Intermittent and not constant  Not really relieved by ondansetron  Will add compazine 10 mg  Told him to take the compazine as needed for acute nausea and then start ondansetron for a few days every 8 hours preventatively and can continue compazine     Acquired hypothyroidism  Had recently increased his levothyroxine    No TSH with labs from the hospital   He will get labs, including TSH, and we will adjust levothyroxine dosing as needed  We are going to have him see endocrinology as well    Adrenal insufficiency due to cancer therapy ([address] )  On higher dose prednisone which should cover physiologic doses of glucocorticoids, but he is still fatigued  Possibly due to deconditioning vs other hormones in the HPA axis, like testosterone  He will try to do the exercises PT recommended and build up his stamina  We are referring him to endocrinology to help evaluate his continued fatigue in presence of supplemented hypothyroidism and adrenal insufficiency  Return in about 4 weeks (around 8/12/2021) for office visit 8/12/2021 and infusion on 9/9/2021       Stefanie Chandler MD, PhD

## 2021-07-15 NOTE — ASSESSMENT & PLAN NOTE
Overall, no clinical evidence of recurrence  Labs from hospitalization reviewed and ok  No TSH, T3, T4, LDH  He is due for PET scan - had to move due to hospitalization and now scheduled for Aug 3  We did discuss continuation of treatment  We discussed that with endocrine side effects, once the gland is damaged, it is unlikely to return  Therefore, it is unlikely that we would do further harm when we continue treatment  We are currently supplementing with appropriate hormone replacement  We will also send him for consultation with endocrinology  He will get labs and will continue with scheduled treatment on July 29, 2021  We will assess TSH at that time and adjust levothyroxine as necessary  He knows to call with any issues or concerns prior to his next visit

## 2021-07-15 NOTE — ASSESSMENT & PLAN NOTE
On higher dose prednisone which should cover physiologic doses of glucocorticoids, but he is still fatigued  Possibly due to deconditioning vs other hormones in the HPA axis, like testosterone  He will try to do the exercises PT recommended and build up his stamina  We are referring him to endocrinology to help evaluate his continued fatigue in presence of supplemented hypothyroidism and adrenal insufficiency

## 2021-07-15 NOTE — ASSESSMENT & PLAN NOTE
Had recently increased his levothyroxine  No TSH with labs from the hospital   He will get labs, including TSH, and we will adjust levothyroxine dosing as needed    We are going to have him see endocrinology as well

## 2021-07-15 NOTE — ASSESSMENT & PLAN NOTE
Intermittent and not constant  Not really relieved by ondansetron  Will add compazine 10 mg    Told him to take the compazine as needed for acute nausea and then start ondansetron for a few days every 8 hours preventatively and can continue compazine

## 2021-07-15 NOTE — ASSESSMENT & PLAN NOTE
He is on immunotherapy and his having endocrine complications that we will continuously monitor  We will also have him see endocrinology  Will monior with each treatment, so labs every 3- 6 weeks

## 2021-07-27 ENCOUNTER — TELEPHONE (OUTPATIENT)
Dept: HEMATOLOGY ONCOLOGY | Facility: CLINIC | Age: 71
End: 2021-07-27

## 2021-07-27 DIAGNOSIS — C43.4 MALIGNANT MELANOMA OF SCALP (HCC): Primary | ICD-10-CM

## 2021-07-27 DIAGNOSIS — R11.0 NAUSEA: Primary | ICD-10-CM

## 2021-07-27 RX ORDER — LACTULOSE 10 G/10G
10 SOLUTION ORAL 3 TIMES DAILY
Qty: 30 EACH | Refills: 0 | Status: CANCELLED | OUTPATIENT
Start: 2021-07-27

## 2021-07-27 RX ORDER — PROMETHAZINE HYDROCHLORIDE 12.5 MG/1
12.5 TABLET ORAL EVERY 6 HOURS PRN
Qty: 30 TABLET | Refills: 0 | Status: CANCELLED | OUTPATIENT
Start: 2021-07-27

## 2021-07-27 NOTE — TELEPHONE ENCOUNTER
Dr Lucina Ballard,      I'm really feeling pretty awful and have been seeming to go down hill for the past week  Nausea has gotten worse and perhaps the most pressing problem (literally) is constipation  I'm using laxatives and nausea meds to little effect  I have no energy or strength  I was supposed to see the radiation oncologist today, but had to cancel  I'm worried about my infusion appointment on Thursday   Any thoughts?     [place]

## 2021-07-27 NOTE — TELEPHONE ENCOUNTER
Called and spoke with patient  He is at [facility] in [place]  He states he has been experiencing nausea, weakness, dizziness, shortness of breath  His PCP sent him to the ED yesterday evening to be evaluated and he is currently admitted  He states they have completed a CT, Xray and ECHO since admission, records not available through care everywhere at this time  Provided patient with Dr Zi Romeo phone number, he will have the attending physician reach out to Dr Harrington to discuss plan

## 2021-07-27 NOTE — TELEPHONE ENCOUNTER
----- Message from Diony Calvin MA sent at 7/27/2021  8:24 AM EDT -----  Regarding: FW: Non-Urgent Medical Question  Contact: [phone]    ----- Message -----  From: Vickie Worthington  Sent: 7/26/2021  12:38 PM EDT  To: Hematology Oncology [facility]  Subject: Non-Urgent Medical Question                      Dr Trevin Adams,     I'm really feeling pretty awful and have been seeming to go down hill for the past week  Nausea has gotten worse and perhaps the most pressing problem (literally) is constipation  I'm using laxatives and nausea meds to little effect  I have no energy or strength  I was supposed to see the radiation oncologist today, but had to cancel  I'm worried about my infusion appointment on Thursday  Any thoughts?     Sage Olivarez

## 2021-07-28 ENCOUNTER — TELEPHONE (OUTPATIENT)
Dept: HEMATOLOGY ONCOLOGY | Facility: CLINIC | Age: 71
End: 2021-07-28

## 2021-07-28 NOTE — TELEPHONE ENCOUNTER
Called and spoke with patient  He is still admitted at [facility] in [place]  He states he is feeling much better  Patient is aware I will be cancelling his treatment for tomorrow  Dr Cheikh Zimmerman will reach out to Dr Colten Bond to discuss plan of care

## 2021-07-29 ENCOUNTER — HOSPITAL ENCOUNTER (OUTPATIENT)
Dept: INFUSION CENTER | Facility: CLINIC | Age: 71
End: 2021-07-29

## 2021-08-03 ENCOUNTER — HOSPITAL ENCOUNTER (OUTPATIENT)
Dept: RADIOLOGY | Age: 71
Discharge: HOME | End: 2021-08-03
Payer: MEDICARE

## 2021-08-03 DIAGNOSIS — C43.4 MALIGNANT MELANOMA OF SCALP (HCC): ICD-10-CM

## 2021-08-03 LAB — GLUCOSE SERPL-MCNC: 116 MG/DL (ref 65–140)

## 2021-08-03 PROCEDURE — A9552 F18 FDG: HCPCS

## 2021-08-03 PROCEDURE — G1004 CDSM NDSC: HCPCS

## 2021-08-03 PROCEDURE — 78816 PET IMAGE W/CT FULL BODY: CPT

## 2021-08-03 PROCEDURE — 82948 REAGENT STRIP/BLOOD GLUCOSE: CPT

## 2021-08-05 ENCOUNTER — CONSULT (OUTPATIENT)
Dept: ENDOCRINOLOGY | Facility: CLINIC | Age: 71
End: 2021-08-05
Payer: MEDICARE

## 2021-08-05 ENCOUNTER — LAB (OUTPATIENT)
Dept: LAB | Facility: CLINIC | Age: 71
End: 2021-08-05
Payer: MEDICARE

## 2021-08-05 VITALS
BODY MASS INDEX: 22.53 KG/M2 | DIASTOLIC BLOOD PRESSURE: 82 MMHG | WEIGHT: 152.13 LBS | SYSTOLIC BLOOD PRESSURE: 112 MMHG | HEART RATE: 100 BPM | HEIGHT: 69 IN

## 2021-08-05 DIAGNOSIS — C43.4 MALIGNANT MELANOMA OF SCALP (HCC): ICD-10-CM

## 2021-08-05 DIAGNOSIS — E27.3 ADRENAL INSUFFICIENCY DUE TO CANCER THERAPY (HCC): ICD-10-CM

## 2021-08-05 DIAGNOSIS — E03.9 ACQUIRED HYPOTHYROIDISM: ICD-10-CM

## 2021-08-05 DIAGNOSIS — E03.9 ACQUIRED HYPOTHYROIDISM: Primary | ICD-10-CM

## 2021-08-05 DIAGNOSIS — Z79.899 HIGH RISK MEDICATION USE: ICD-10-CM

## 2021-08-05 LAB
ALBUMIN SERPL BCP-MCNC: 2.9 G/DL (ref 3.5–5)
ALP SERPL-CCNC: 69 U/L (ref 46–116)
ALT SERPL W P-5'-P-CCNC: 22 U/L (ref 12–78)
ANION GAP SERPL CALCULATED.3IONS-SCNC: 8 MMOL/L (ref 4–13)
AST SERPL W P-5'-P-CCNC: 13 U/L (ref 5–45)
BASOPHILS # BLD MANUAL: 0 THOUSAND/UL (ref 0–0.1)
BASOPHILS NFR MAR MANUAL: 0 % (ref 0–1)
BILIRUB SERPL-MCNC: 0.39 MG/DL (ref 0.2–1)
BUN SERPL-MCNC: 20 MG/DL (ref 5–25)
CALCIUM ALBUM COR SERPL-MCNC: 10 MG/DL (ref 8.3–10.1)
CALCIUM SERPL-MCNC: 9.1 MG/DL (ref 8.3–10.1)
CHLORIDE SERPL-SCNC: 98 MMOL/L (ref 100–108)
CO2 SERPL-SCNC: 25 MMOL/L (ref 21–32)
CREAT SERPL-MCNC: 1 MG/DL (ref 0.6–1.3)
EOSINOPHIL # BLD MANUAL: 0.25 THOUSAND/UL (ref 0–0.4)
EOSINOPHIL NFR BLD MANUAL: 2 % (ref 0–6)
ERYTHROCYTE [DISTWIDTH] IN BLOOD BY AUTOMATED COUNT: 14.8 % (ref 11.6–15.1)
GFR SERPL CREATININE-BSD FRML MDRD: 74 ML/MIN/1.73SQ M
GLUCOSE SERPL-MCNC: 90 MG/DL (ref 65–140)
HCT VFR BLD AUTO: 45 % (ref 36.5–49.3)
HGB BLD-MCNC: 14.9 G/DL (ref 12–17)
LDH SERPL-CCNC: 220 U/L (ref 81–234)
LYMPHOCYTES # BLD AUTO: 0.38 THOUSAND/UL (ref 0.6–4.47)
LYMPHOCYTES # BLD AUTO: 3 % (ref 14–44)
MCH RBC QN AUTO: 30.8 PG (ref 26.8–34.3)
MCHC RBC AUTO-ENTMCNC: 33.1 G/DL (ref 31.4–37.4)
MCV RBC AUTO: 93 FL (ref 82–98)
MONOCYTES # BLD AUTO: 0.63 THOUSAND/UL (ref 0–1.22)
MONOCYTES NFR BLD: 5 % (ref 4–12)
NEUTROPHILS # BLD MANUAL: 11.28 THOUSAND/UL (ref 1.85–7.62)
NEUTS SEG NFR BLD AUTO: 90 % (ref 43–75)
NRBC BLD AUTO-RTO: 0 /100 WBCS
NRBC BLD AUTO-RTO: 1 /100 WBC (ref 0–2)
PLATELET # BLD AUTO: 284 THOUSANDS/UL (ref 149–390)
PLATELET BLD QL SMEAR: ADEQUATE
PMV BLD AUTO: 8.5 FL (ref 8.9–12.7)
POTASSIUM SERPL-SCNC: 4.2 MMOL/L (ref 3.5–5.3)
PROT SERPL-MCNC: 6.8 G/DL (ref 6.4–8.2)
RBC # BLD AUTO: 4.83 MILLION/UL (ref 3.88–5.62)
SMUDGE CELLS BLD QL SMEAR: PRESENT
SODIUM SERPL-SCNC: 131 MMOL/L (ref 136–145)
T3FREE SERPL-MCNC: 1.85 PG/ML (ref 2.3–4.2)
T4 FREE SERPL-MCNC: 1.45 NG/DL (ref 0.76–1.46)
TSH SERPL DL<=0.05 MIU/L-ACNC: 0.42 UIU/ML (ref 0.36–3.74)
WBC # BLD AUTO: 12.53 THOUSAND/UL (ref 4.31–10.16)

## 2021-08-05 PROCEDURE — 80053 COMPREHEN METABOLIC PANEL: CPT | Performed by: INTERNAL MEDICINE

## 2021-08-05 PROCEDURE — 83615 LACTATE (LD) (LDH) ENZYME: CPT | Performed by: INTERNAL MEDICINE

## 2021-08-05 PROCEDURE — 99204 OFFICE O/P NEW MOD 45 MIN: CPT | Performed by: INTERNAL MEDICINE

## 2021-08-05 PROCEDURE — 36415 COLL VENOUS BLD VENIPUNCTURE: CPT | Performed by: INTERNAL MEDICINE

## 2021-08-05 PROCEDURE — 84443 ASSAY THYROID STIM HORMONE: CPT | Performed by: INTERNAL MEDICINE

## 2021-08-05 PROCEDURE — 84481 FREE ASSAY (FT-3): CPT | Performed by: INTERNAL MEDICINE

## 2021-08-05 PROCEDURE — 85027 COMPLETE CBC AUTOMATED: CPT | Performed by: INTERNAL MEDICINE

## 2021-08-05 PROCEDURE — 85007 BL SMEAR W/DIFF WBC COUNT: CPT | Performed by: INTERNAL MEDICINE

## 2021-08-05 PROCEDURE — 84439 ASSAY OF FREE THYROXINE: CPT

## 2021-08-05 NOTE — ASSESSMENT & PLAN NOTE
Patient is currently on prednisone and Florinef  He is experiencing fatigue and weakness and blurry vision  All these symptoms could be related to hyponatremia or steroid side effects  Could also have some component of thyroid dysfunction in the setting of Synthroid use at 125 mcg  Plan: Will reassess labs for hyponatremia and TSH, T4  After that we will reassess steroid and Synthroid dosing

## 2021-08-05 NOTE — PROGRESS NOTES
Maria De Jesus Slider 66 y o  male MRN: [id number]    Encounter: [id number]      Assessment/Plan     Assessment: This is a 66y o -year-old male with hypothyroidism, malignant melanoma stage IIIC, adrenal insufficiency secondary to [facility] therapy, sarcoidosis who presents for evaluation and management of the endocrine sequelae of his Keytruda therapy/other medications he is on  Please see individual problem based assessment/plan below  Plan:  Problem List Items Addressed This Visit        Endocrine    Acquired hypothyroidism - Primary      Most recent TSH is low, with T4 high however these labs were drawn in June  Will get repeat labs  Patient is presenting with signs of thyroid dysfunction with labs that suggest hyperthyroidism  Plan:  After repeat labs consider decreasing Synthroid to a lower dose         Relevant Orders    T4, free Lab Collect    TSH, 3rd generation Lab Collect    Comprehensive metabolic panel Lab Collect    Adrenal insufficiency due to cancer therapy [facility])      Patient is currently on prednisone and Florinef  He is experiencing fatigue, myopathy,  and blurry vision  All these symptoms could be related to hyponatremia and/or steroid side effects  Could also have some component of thyroid dysfunction in the setting of Synthroid use at 125 mcg  Plan: Will reassess labs for hyponatremia and TSH, T4  After that we will reassess steroid and Synthroid dosing  Other    Malignant melanoma of scalp ([facility] [address] )       Patient is currently on [facility] however has missed last 2 infusions due to symptoms and dehydration leading him to be hospitalized  Is to meet with Dr Roya Tay next week to form a plan going forward  See other plan  High risk medication use     Patient  He has been experiencing several symptoms such as fatigue, muscle weakness, skin dryness, hoarseness,  Constipation, blurred vision, nausea       He is on Keytruda for melanoma of the scalp, Synthroid 125mcg, prednisone, and florinef which may be contributing to symptoms     after repeat labs, will consider decreasing Synthroid dose and titrating steroids                 CC: Adrenal insufficiency secondary to cancer therapy    History of Present Illness     HPI:  This is a 66y o -year-old male with hypothyroidism, malignant melanoma stage IIIC, adrenal insufficiency secondary to [facility] therapy, sarcoidosis who presents for evaluation and management of the endocrine sequelae of his Keytruda therapy/other medications he is on  These symptoms include but are not limited to fatigue, weakness both generalized and muscle fatigue, dry skin, 35 lb weight loss since December  He is seen by Dr Roya Tay for his melanoma diagnosed in June 2020, s/p radiation, on Keytruda since December 2020  His symptoms began around March, with a cough and fatigue  Since then he has been hospitalized twice at [address] [place] [address] in [place], [place] due to dehydration with hyponatremia  He was then seen by his PCP and Nephrology, put on prednisone and florinef  His synthroid was increased due to labs showing inadequate therapy from his original dose of 50mcg, he was eventually increased to 125mcg  Currently, he has numerous symptoms that are reported below:      Review of Systems   Constitutional: Positive for activity change, appetite change, fatigue and unexpected weight change  Negative for chills and fever  HENT: Positive for voice change  Eyes: Positive for visual disturbance (blurred vision)  Respiratory: Negative for shortness of breath  Cardiovascular: Negative for chest pain, palpitations and leg swelling  Gastrointestinal: Positive for abdominal pain, constipation and nausea  Endocrine: Positive for cold intolerance  Negative for polydipsia and polyuria  Genitourinary: Negative for dysuria and frequency  Musculoskeletal: Positive for myalgias  Skin: Negative for rash     Neurological: Positive for tremors and weakness  Negative for headaches  Psychiatric/Behavioral: Positive for sleep disturbance  Historical Information   Past Medical History:   Diagnosis Date    Migraines     Prostate cancer ([address] )     Sarcoidosis     Skin cancer      Past Surgical History:   Procedure Laterality Date    HERNIA REPAIR      PROSTATE SURGERY       Social History   Social History     Substance and Sexual Activity   Alcohol Use Not Currently     Social History     Substance and Sexual Activity   Drug Use Never     Social History     Tobacco Use   Smoking Status Former Smoker    Quit date: 0    Years since quittin 6   Smokeless Tobacco Never Used     Family History: No family history on file      Meds/Allergies   Current Outpatient Medications   Medication Sig Dispense Refill    Aspirin Buf,CaCarb-MgCarb-MgO, 81 MG TABS Take 81 mg by mouth      benzonatate (TESSALON PERLES) 100 mg capsule Take 100 mg by mouth 3 (three) times a day as needed for cough      betamethasone dipropionate (DIPROSONE) 0 05 % cream Apply topically 2 (two) times a day      fludrocortisone (FLORINEF) 0 1 mg tablet Take 0 1 mg by mouth every morning      gemfibrozil (LOPID) 600 mg tablet Take 600 mg by mouth      levothyroxine 125 mcg tablet Take 1 tablet (125 mcg total) by mouth daily 30 tablet 5    ondansetron (ZOFRAN) 4 mg tablet Take 1 tablet (4 mg total) by mouth every 8 (eight) hours as needed for nausea or vomiting 30 tablet 5    pantoprazole (PROTONIX) 40 mg tablet Take 40 mg by mouth daily      predniSONE 20 mg tablet Take 1 tablet (20 mg total) by mouth 3 (three) times a day (Patient taking differently: Taking 30 mg am and 10 mg pm) 30 tablet 2    prochlorperazine (COMPAZINE) 10 mg tablet Take 1 tablet (10 mg total) by mouth every 6 (six) hours as needed for nausea or vomiting 30 tablet 2    simvastatin (ZOCOR) 40 mg tablet Take 40 mg by mouth      SUMAtriptan (IMITREX) 50 mg tablet Take 50 mg by mouth once as needed       levothyroxine 50 mcg tablet Take 100 mcg by mouth daily       No current facility-administered medications for this visit  Allergies   Allergen Reactions    Chocolate - Food Allergy Other (See Comments)    Iodinated Diagnostic Agents Hives     CT contrast dye      Penicillins Hives    Sulfa Antibiotics Hives    Banana - Food Allergy Headache       Objective   Vitals: Blood pressure 112/82, pulse 100, height 5' 9" (1 753 m), weight 69 kg (152 lb 2 oz)  Physical Exam  Vitals reviewed  Constitutional:       General: He is not in acute distress  Appearance: He is normal weight  Eyes:      Extraocular Movements: Extraocular movements intact  Pupils: Pupils are equal, round, and reactive to light  Neck:      Thyroid: No thyroid mass or thyromegaly  Cardiovascular:      Rate and Rhythm: Normal rate and regular rhythm  Heart sounds: Normal heart sounds  Pulmonary:      Effort: Pulmonary effort is normal  No respiratory distress  Breath sounds: Normal breath sounds  Abdominal:      General: Abdomen is flat  Bowel sounds are normal  There is no distension  Palpations: Abdomen is soft  Tenderness: There is no abdominal tenderness  Musculoskeletal:         General: No swelling or tenderness  Cervical back: Neck supple  No rigidity or tenderness  Skin:     General: Skin is warm and dry  Neurological:      Mental Status: He is alert and oriented to person, place, and time  Sensory: Sensation is intact  Motor: No weakness  Deep Tendon Reflexes: Reflexes are normal and symmetric  Psychiatric:         Mood and Affect: Mood normal          The history was obtained from the review of the chart, patient and family      Lab Results:   Lab Results   Component Value Date/Time    White Blood Cell Count 10 8 06/15/2021 01:23 PM    White Blood Cell Count 5 8 06/02/2021 08:24 AM    White Blood Cell Count 5 7 04/28/2021 08:37 AM    Hemoglobin 15 2 06/15/2021 01:23 PM    Hemoglobin 15 4 06/02/2021 08:24 AM    Hemoglobin 14 4 04/28/2021 08:37 AM    HCT 44 2 06/15/2021 01:23 PM    HCT 45 3 06/02/2021 08:24 AM    HCT 42 5 04/28/2021 08:37 AM    MCV 86 06/15/2021 01:23 PM    MCV 86 06/02/2021 08:24 AM    MCV 85 04/28/2021 08:37 AM    Platelet Count 000 27/61/4529 01:23 PM    Platelet Count 930 03/95/7438 08:24 AM    Platelet Count 978 92/55/7690 08:37 AM    BUN 30 (H) 06/15/2021 01:23 PM    BUN 21 06/02/2021 08:24 AM    BUN 19 04/28/2021 08:37 AM    Potassium 4 9 06/15/2021 01:23 PM    Potassium 4 8 06/02/2021 08:24 AM    Potassium 4 6 04/28/2021 08:37 AM    Chloride 97 06/15/2021 01:23 PM    Chloride 91 (L) 06/02/2021 08:24 AM    Chloride 97 04/28/2021 08:37 AM    CO2 21 06/15/2021 01:23 PM    CO2 19 (L) 06/02/2021 08:24 AM    CO2 23 04/28/2021 08:37 AM    Creatinine 1 16 06/15/2021 01:23 PM    Creatinine 1 22 06/02/2021 08:24 AM    Creatinine 1 22 04/28/2021 08:37 AM    AST 11 06/15/2021 01:23 PM    AST 12 06/02/2021 08:24 AM    AST 17 04/28/2021 08:37 AM    ALT 17 06/15/2021 01:23 PM    ALT 10 06/02/2021 08:24 AM    ALT 13 04/28/2021 08:37 AM    Albumin 4 0 06/15/2021 01:23 PM    Albumin 4 2 06/02/2021 08:24 AM    Albumin 4 2 04/28/2021 08:37 AM    Globulin, Total 3 0 06/15/2021 01:23 PM    Globulin, Total 3 5 06/02/2021 08:24 AM    Globulin, Total 3 8 04/28/2021 08:37 AM         portions of the record may have been created with voice recognition software  Occasional wrong word or "sound a like" substitutions may have occurred due to the inherent limitations of voice recognition software  Read the chart carefully and recognize, using context, where substitutions have occurred

## 2021-08-05 NOTE — ASSESSMENT & PLAN NOTE
Patient  He has been experiencing several symptoms such as fatigue, muscle Muscle weakness, skin dryness, hoarseness,  Constipation, blurred vision, nausea       He is on Keytruda for melanoma of the scalp, Synthroid 125mcg, prednisone, and florinef which may be contributing to symptoms     after repeat labs, will consider decreasing Synthroid dose and titrating steroids

## 2021-08-05 NOTE — LETTER
August 5, 2021     Alexandra Langley MD  [address] [facility]  [address]  [place] [address] [facility]    Patient: Bruce Fleming   YOB: 1950   Date of Visit: 8/5/2021       Dear Dr Jack Barker: Thank you for referring Milka West to me for evaluation  Below are my notes for this consultation  If you have questions, please do not hesitate to call me  I look forward to following your patient along with you           Sincerely,        Jayme Edwards MD        CC: Dyan Hammans, MD

## 2021-08-05 NOTE — ASSESSMENT & PLAN NOTE
Patient is currently on Suan Mattock however has missed last 2 infusions due to symptoms and dehydration leading him to be hospitalized  Is to meet with Dr Isabelle Lindsay next week to form a plan going forward  See other plan

## 2021-08-05 NOTE — ASSESSMENT & PLAN NOTE
Most recent TSH is low, with T4 high however these labs were drawn in June  Will get repeat labs  Patient is presenting with signs thyroid dysfunction    Plan:  After repeat labs consider decreasing Synthroid to a lower dose

## 2021-08-06 DIAGNOSIS — E03.9 ACQUIRED HYPOTHYROIDISM: Primary | ICD-10-CM

## 2021-08-06 RX ORDER — LEVOTHYROXINE SODIUM 0.1 MG/1
100 TABLET ORAL DAILY
Qty: 60 TABLET | Refills: 0 | Status: SHIPPED | OUTPATIENT
Start: 2021-08-06 | End: 2021-09-20

## 2021-08-06 RX ORDER — LEVOTHYROXINE SODIUM 0.1 MG/1
100 TABLET ORAL DAILY
Qty: 60 TABLET | Refills: 1 | Status: CANCELLED | OUTPATIENT
Start: 2021-08-06

## 2021-08-06 NOTE — RESULT ENCOUNTER NOTE
The laboratory testing results are normal Since the TSH is on the lower side of normal in the free T4 is on the higher side of normal, would decrease levothyroxine to 100 mcg per day  Check TSH and free T4 in six weeks      Sent to my chart

## 2021-08-09 ENCOUNTER — TELEPHONE (OUTPATIENT)
Dept: ENDOCRINOLOGY | Facility: CLINIC | Age: 71
End: 2021-08-09

## 2021-08-11 ENCOUNTER — TELEPHONE (OUTPATIENT)
Dept: HEMATOLOGY ONCOLOGY | Facility: CLINIC | Age: 71
End: 2021-08-11

## 2021-08-11 DIAGNOSIS — E53.8 VITAMIN B 12 DEFICIENCY: Primary | ICD-10-CM

## 2021-08-11 PROBLEM — E53.9 VITAMIN B DEFICIENCY: Status: ACTIVE | Noted: 2021-08-11

## 2021-08-11 LAB
FERRITIN SERPL-MCNC: 108 NG/ML (ref 30–400)
FOLATE SERPL-MCNC: 16.3 NG/ML
IRON SATN MFR SERPL: 61 % (ref 15–55)
IRON SERPL-MCNC: 160 UG/DL (ref 38–169)
TIBC SERPL-MCNC: 264 UG/DL (ref 250–450)
UIBC SERPL-MCNC: 104 UG/DL (ref 111–343)
VIT B12 SERPL-MCNC: 221 PG/ML (ref 232–1245)

## 2021-08-11 NOTE — TELEPHONE ENCOUNTER
Called and spoke with patient  He is aware that his vitamin B 12 level is low  He is scheduled for first Vitamin B12 injection tomorrow at [facility] at 12:30, appointment with Dr Tasia Smiley at 11:20  He is aware we have also sent vitamin B12 tablets to his pharmacy as well  All questions answered and patient verbalized understanding

## 2021-08-12 ENCOUNTER — OFFICE VISIT (OUTPATIENT)
Dept: HEMATOLOGY ONCOLOGY | Facility: CLINIC | Age: 71
End: 2021-08-12
Payer: MEDICARE

## 2021-08-12 ENCOUNTER — TELEPHONE (OUTPATIENT)
Dept: HEMATOLOGY ONCOLOGY | Facility: CLINIC | Age: 71
End: 2021-08-12

## 2021-08-12 ENCOUNTER — HOSPITAL ENCOUNTER (OUTPATIENT)
Dept: INFUSION CENTER | Facility: CLINIC | Age: 71
Discharge: HOME | End: 2021-08-12
Payer: MEDICARE

## 2021-08-12 VITALS
WEIGHT: 157 LBS | BODY MASS INDEX: 23.25 KG/M2 | OXYGEN SATURATION: 97 % | HEART RATE: 89 BPM | RESPIRATION RATE: 16 BRPM | DIASTOLIC BLOOD PRESSURE: 72 MMHG | HEIGHT: 69 IN | SYSTOLIC BLOOD PRESSURE: 112 MMHG | TEMPERATURE: 97.4 F

## 2021-08-12 DIAGNOSIS — E53.9 VITAMIN B DEFICIENCY: Primary | ICD-10-CM

## 2021-08-12 DIAGNOSIS — Z79.899 HIGH RISK MEDICATION USE: ICD-10-CM

## 2021-08-12 DIAGNOSIS — E27.3 ADRENAL INSUFFICIENCY DUE TO CANCER THERAPY (HCC): ICD-10-CM

## 2021-08-12 DIAGNOSIS — C43.4 MALIGNANT MELANOMA OF SCALP (HCC): Primary | ICD-10-CM

## 2021-08-12 DIAGNOSIS — E53.9 VITAMIN B DEFICIENCY: ICD-10-CM

## 2021-08-12 DIAGNOSIS — E03.9 ACQUIRED HYPOTHYROIDISM: ICD-10-CM

## 2021-08-12 PROCEDURE — 96372 THER/PROPH/DIAG INJ SC/IM: CPT

## 2021-08-12 PROCEDURE — 99215 OFFICE O/P EST HI 40 MIN: CPT | Performed by: INTERNAL MEDICINE

## 2021-08-12 RX ORDER — CYANOCOBALAMIN 1000 UG/ML
1000 INJECTION INTRAMUSCULAR; SUBCUTANEOUS ONCE
Status: COMPLETED | OUTPATIENT
Start: 2021-08-12 | End: 2021-08-12

## 2021-08-12 RX ORDER — LACTULOSE 10 G/15ML
SOLUTION ORAL
COMMUNITY
Start: 2021-08-02

## 2021-08-12 RX ORDER — CYANOCOBALAMIN 1000 UG/ML
1000 INJECTION INTRAMUSCULAR; SUBCUTANEOUS ONCE
Status: CANCELLED | OUTPATIENT
Start: 2021-09-09

## 2021-08-12 RX ADMIN — CYANOCOBALAMIN 1000 MCG: 1000 INJECTION, SOLUTION INTRAMUSCULAR; SUBCUTANEOUS at 12:44

## 2021-08-12 NOTE — PROGRESS NOTES
Patient arrives to infusion center for vitamin B12 injection  Patient offers no complaints  Patient tolerated injection to LEFT deltoid without issue, bandaid in place  Patient aware of next appts, has AVS from Dr Ramos Po appt earlier today  Patient aware of lab work schedule, denies any questions at this time  Patient AAox4 and ambulatory upon DC without gait disturbance noted

## 2021-08-12 NOTE — ASSESSMENT & PLAN NOTE
Thought to be over replaced with levothyroxine  Saw endocrine and decreased dose  Will continue to monitor and defer to endocrine for titration

## 2021-08-12 NOTE — ASSESSMENT & PLAN NOTE
He is on treatment with immunotherapy and we will continue to monitor for side effects and lab abnormalities  We will hold treatment at this time  We will monitor labs with each treatment, and routinely as holding treatment, to ensure safety of continuing on treatment and monitoring side effects  He knows to watch for signs and symptoms for immune mediated side effects

## 2021-08-12 NOTE — PROGRESS NOTES
[facility]  [address] [address]  [phone]     Date of Visit: 8/12/2021  Name: Terri Delgadillo   YOB: 1950       Subjective    VISIT DIAGNOSIS:  Diagnoses and all orders for this visit:    Malignant melanoma of scalp ([facility] )    Acquired hypothyroidism    Adrenal insufficiency due to cancer therapy ([facility] )    High risk medication use    Vitamin B deficiency    Other orders  -     lactulose ([address] [place] [employer]) 10 g/15 mL solution; DISSOLVE 30 MLS IN [place] [address] TWICE DAILY        Oncology History   Malignant melanoma of scalp ([facility] )   5/15/2020 Initial Diagnosis    Malign[facility] ([facility] )     6/15/2020 -  Cancer Staged    Staging form: Melanoma of the Skin, AJCC 8th Edition  - Clinical stage from 6/15/2020: Stage IIB (cT3b, cN0, cM0) - Signed by Curt Marinelli MD on 3/22/2021       10/26/2020 -  Cancer Staged    Staging form: Melanoma of the Skin, AJCC 8th Edition  - Pathologic stage from 10/26/2020: Stage IIIC (pT3b, pN1c, cM0) - Signed by Curt Marinelli MD on 3/22/2021       11/2/2020 - 11/2/2020 Radiation    Performed at location closer to home    Radiation to scalp         12/28/2020 -  Chemotherapy    pembrolizumab (KEYTRUDA) IVPB, 400 mg (200 % of original dose 200 mg), Intravenous, Once, 5 of 9 cycles  Dose modification: 400 mg (original dose 200 mg, Cycle 1, Reason: Other (Must fill in a comment), Comment: new 6 week dosing)  Administration: 400 mg (5/6/2021)        Cancer Staging  Malignant melanoma of scalp ([facility] )  Staging form: Melanoma of the Skin, AJCC 8th Edition  - Clinical stage from 6/15/2020: Stage IIB (cT3b, cN0, cM0) - Signed by Curt Marinelli MD on 3/22/2021  - Pathologic stage from 10/26/2020: Stage IIIC (pT3b, pN1c, cM0) - Signed by Curt Marinelli MD on 3/22/2021     Treatment Details   Treatment goal Curative   Plan Name OP Pembrolizumab Q 42 Days   Status Active   Start Date 12/28/2020   End Date 1/13/2022 (Planned)   Provider Debora Doran MD   Chemotherapy pembrolizumab [facility]) IVPB, 400 mg (200 % of original dose 200 mg), Intravenous, Once, 5 of 9 cycles  Dose modification: 400 mg (original dose 200 mg, Cycle 1, Reason: Other (Must fill in a comment), Comment: new 6 week dosing)  Administration: 400 mg (5/6/2021)       Treatment Details   Treatment goal [No plan goal]   Plan Name VITAMIN B12 INJECTION (CYANOCOBALAMIN)   Status Active   Start Date 8/12/2021   End Date Until discontinued   Provider Debora Doran MD   Chemotherapy cyanocobalamin, 1,000 mcg, Intramuscular, Once, 1 of 1 cycle  Administration: 1,000 mcg (8/12/2021)          HISTORY OF PRESENT ILLNESS: Brandt Thakkar is a 66 y o  male  who has Stage IIIC melanoma on adjvuant treatment with pembrolizumab which has been on hold for some time now due to side effects  He was recently admitted again to the hospital with profound fatigue and not eating or drinking  Extensive work up negative for underlying cause other than known hypothyroidism and adrenal insufficiency  At one time thought to have GI bleed/ulcer, which was negative  He did receive stress dose steroids and is now on 40 mg prednisone daily - 30 mg prednisone in the AM and 10 mg prednisone in the PM   He has established care with endocrinology and will see them again in 6 weeks, as they reduced the dose of his levothyroxine  He did see his dermatologist 2 months ago and all was fine at that visit  Recently followed up with radiation oncology, as well  He is feeling better now for two weeks since discharge and eating regular meals  He is putting on a few pounds  He is bruising more easily and I explained that is due to the steroids  He denies any new, changing, or concerning lesions  He denies headaches, double vision, rash, itching and diarrhea      We discussed his recent PET scan that demonstrated stable area on his scalp and no evidence of distant metastatic melanoma  REVIEW OF SYSTEMS:  Review of Systems   Constitutional: Positive for appetite change (improving), fatigue (improving) and unexpected weight change (improving)  Negative for fever  HENT:   Negative for lump/mass  Eyes: Negative for icterus  Respiratory: Negative for cough, shortness of breath and wheezing  Cardiovascular: Negative for leg swelling  Gastrointestinal: Negative for abdominal pain, constipation, diarrhea, nausea and vomiting  Genitourinary: Negative for difficulty urinating and hematuria  Musculoskeletal: Negative for arthralgias, gait problem and myalgias  Skin: Negative for itching and rash  No new, changing, or concerning lesions  Neurological: Negative for extremity weakness, gait problem, headaches, light-headedness and numbness  Hematological: Negative for adenopathy  Bruises/bleeds easily          MEDICATIONS:    Current Outpatient Medications:     Aspirin Buf,CaCarb-MgCarb-MgO, 81 MG TABS, Take 81 mg by mouth, Disp: , Rfl:     benzonatate (TESSALON PERLES) 100 mg capsule, Take 100 mg by mouth 3 (three) times a day as needed for cough, Disp: , Rfl:     betamethasone dipropionate (DIPROSONE) 0 05 % cream, Apply topically 2 (two) times a day, Disp: , Rfl:     cyanocobalamin (VITAMIN B-12) 1000 MCG tablet, Take 1 tablet (1,000 mcg total) by mouth daily, Disp: 30 tablet, Rfl: 3    fludrocortisone (FLORINEF) 0 1 mg tablet, Take 0 1 mg by mouth every morning, Disp: , Rfl:     gemfibrozil (LOPID) 600 mg tablet, Take 600 mg by mouth, Disp: , Rfl:     lactulose (CHRONULAC) 10 g/15 mL solution, DISSOLVE 30 MLS IN JUICE/WATER AND DRINK TWICE DAILY, Disp: , Rfl:     levothyroxine 100 mcg tablet, Take 1 tablet (100 mcg total) by mouth daily, Disp: 60 tablet, Rfl: 0    levothyroxine 125 mcg tablet, Take 1 tablet (125 mcg total) by mouth daily, Disp: 30 tablet, Rfl: 5    ondansetron (ZOFRAN) 4 mg tablet, Take 1 tablet (4 mg total) by mouth every 8 (eight) hours as needed for nausea or vomiting, Disp: 30 tablet, Rfl: 5    pantoprazole (PROTONIX) 40 mg tablet, Take 40 mg by mouth daily, Disp: , Rfl:     predniSONE 20 mg tablet, Take 1 tablet (20 mg total) by mouth 3 (three) times a day (Patient taking differently: Taking 30 mg am and 10 mg pm), Disp: 30 tablet, Rfl: 2    prochlorperazine (COMPAZINE) 10 mg tablet, Take 1 tablet (10 mg total) by mouth every 6 (six) hours as needed for nausea or vomiting, Disp: 30 tablet, Rfl: 2    simvastatin (ZOCOR) 40 mg tablet, Take 40 mg by mouth, Disp: , Rfl:     SUMAtriptan (IMITREX) 50 mg tablet, Take 50 mg by mouth once as needed , Disp: , Rfl:   No current facility-administered medications for this visit       ALLERGIES:  Allergies   Allergen Reactions    Chocolate - Food Allergy Other (See Comments)    Iodinated Diagnostic Agents Hives     CT contrast dye      Penicillins Hives    Sulfa Antibiotics Hives    Banana - Food Allergy Headache        ACTIVE PROBLEMS:  Patient Active Problem List   Diagnosis    [facility]    Acquired hypothyroidism    Coronary arteriosclerosis    RBBB    Sarcoidosis    Seizures ([facility]    Adrenal insufficiency due to cancer therapy ([facility]    High risk medication use    Nausea    Vitamin B deficiency          PAST MEDICAL HISTORY:   Past Medical History:   Diagnosis Date    Migraines     [facility]     Sarcoidosis     Skin cancer         PAST SURGICAL HISTORY:  Past Surgical History:   Procedure Laterality Date    HERNIA REPAIR      PROSTATE SURGERY          SOCIAL HISTORY:  Social History     Socioeconomic History    Marital status: /Civil Union     Spouse name: Not on file    Number of children: Not on file    Years of education: Not on file    Highest education level: Not on file   Occupational History    Not on file   Tobacco Use    Smoking status: Former Smoker     Quit date: 1972     Years since quittin 6    Smokeless tobacco: Never Used   Vaping Use    Vaping Use: Never used   Substance and Sexual Activity    Alcohol use: Not Currently    Drug use: Never    Sexual activity: Not on file   Other Topics Concern    Not on file   Social History Narrative    Not on file     Social Determinants of Health     Financial Resource Strain:     Difficulty of Paying Living Expenses:    Food Insecurity:     Worried About Running Out of Food in the Last Year:     [address] in the Last Year:    Transportation Needs:     Lack of Transportation (Medical):  Lack of Transportation (Non-Medical):    Physical Activity:     Days of Exercise per Week:     Minutes of Exercise per Session:    Stress:     Feeling of Stress :    Social Connections:     Frequency of Communication with Friends and Family:     Frequency of Social Gatherings with Friends and Family:     Attends Congregational Services:     Active Member of Clubs or Organizations:     Attends Club or Organization Meetings:     Marital Status:    Intimate Partner Violence:     Fear of Current or Ex-Partner:     Emotionally Abused:     Physically Abused:     Sexually Abused:         FAMILY HISTORY:  No family history on file  Objective    PHYSICAL EXAMINATION:   Blood pressure 112/72, pulse 89, temperature (!) 97 4 °F (36 3 °C), temperature source Temporal, resp  rate 16, height 5' 9 1" (1 755 m), weight 71 2 kg (157 lb), SpO2 97 %  ECOG Performance Status      Most Recent Value   ECOG Performance Status  1 - Restricted in physically strenuous activity but ambulatory and able to carry out work of a light or sedentary nature, e g , light house work, office work             Physical Exam  Constitutional:       General: He is not in acute distress  Appearance: Normal appearance  He is not toxic-appearing  HENT:      Mouth/Throat:      Mouth: Mucous membranes are moist       Pharynx: Oropharynx is clear     Eyes:      General: No scleral icterus  Cardiovascular:      Rate and Rhythm: Normal rate and regular rhythm  Pulses: Normal pulses  Heart sounds: No murmur heard  No friction rub  No gallop  Pulmonary:      Effort: Pulmonary effort is normal  No respiratory distress  Breath sounds: Normal breath sounds  No wheezing or rales  Abdominal:      General: There is no distension  Palpations: There is no mass  Tenderness: There is no abdominal tenderness  There is no rebound  Musculoskeletal:         General: No swelling or tenderness  Right lower leg: No edema  Left lower leg: No edema  Lymphadenopathy:      Head:      Right side of head: No submandibular, preauricular or posterior auricular adenopathy  Left side of head: No submandibular, preauricular or posterior auricular adenopathy  Cervical: No cervical adenopathy  Right cervical: No superficial or posterior cervical adenopathy  Left cervical: No superficial or posterior cervical adenopathy  Upper Body:      Right upper body: No supraclavicular or axillary adenopathy  Left upper body: No supraclavicular or axillary adenopathy  Lower Body: No right inguinal adenopathy  No left inguinal adenopathy  Skin:     Findings: Bruising (bilateral arms) present  No rash  Comments: Well healed surgical scar  No evidence of recurrence at primary site  Healing bruise from bumping his head  Neurological:      General: No focal deficit present  Mental Status: He is alert and oriented to person, place, and time  Psychiatric:         Mood and Affect: Mood normal          Behavior: Behavior normal          Thought Content: Thought content normal          Judgment: Judgment normal          I reviewed lab data in the chart      WBC   Date Value Ref Range Status   08/05/2021 12 53 (H) 4 31 - 10 16 Thousand/uL Final     White Blood Cell Count   Date Value Ref Range Status   06/15/2021 10 8 3 4 - 10 8 x10E3/uL Final 06/02/2021 5 8 3 4 - 10 8 x10E3/uL Final   04/28/2021 5 7 3 4 - 10 8 x10E3/uL Final     Hemoglobin   Date Value Ref Range Status   08/05/2021 14 9 12 0 - 17 0 g/dL Final   06/15/2021 15 2 13 0 - 17 7 g/dL Final   06/02/2021 15 4 13 0 - 17 7 g/dL Final   04/28/2021 14 4 13 0 - 17 7 g/dL Final     Platelet Count   Date Value Ref Range Status   06/15/2021 267 150 - 450 x10E3/uL Final   06/02/2021 364 150 - 450 x10E3/uL Final   04/28/2021 251 150 - 450 x10E3/uL Final     Platelets   Date Value Ref Range Status   08/05/2021 284 149 - 390 Thousands/uL Final     MCV   Date Value Ref Range Status   08/05/2021 93 82 - 98 fL Final   06/15/2021 86 79 - 97 fL Final   06/02/2021 86 79 - 97 fL Final   04/28/2021 85 79 - 97 fL Final      Potassium   Date Value Ref Range Status   08/05/2021 4 2 3 5 - 5 3 mmol/L Final   06/15/2021 4 9 3 5 - 5 2 mmol/L Final   06/02/2021 4 8 3 5 - 5 2 mmol/L Final   04/28/2021 4 6 3 5 - 5 2 mmol/L Final     Chloride   Date Value Ref Range Status   08/05/2021 98 (L) 100 - 108 mmol/L Final   06/15/2021 97 96 - 106 mmol/L Final   06/02/2021 91 (L) 96 - 106 mmol/L Final   04/28/2021 97 96 - 106 mmol/L Final     CO2   Date Value Ref Range Status   08/05/2021 25 21 - 32 mmol/L Final   06/15/2021 21 20 - 29 mmol/L Final   06/02/2021 19 (L) 20 - 29 mmol/L Final   04/28/2021 23 20 - 29 mmol/L Final     BUN   Date Value Ref Range Status   08/05/2021 20 5 - 25 mg/dL Final   06/15/2021 30 (H) 8 - 27 mg/dL Final   06/02/2021 21 8 - 27 mg/dL Final   04/28/2021 19 8 - 27 mg/dL Final     Creatinine   Date Value Ref Range Status   08/05/2021 1 00 0 60 - 1 30 mg/dL Final     Comment:     Standardized to IDMS reference method   06/15/2021 1 16 0 76 - 1 27 mg/dL Final   06/02/2021 1 22 0 76 - 1 27 mg/dL Final   04/28/2021 1 22 0 76 - 1 27 mg/dL Final     Glucose, Random   Date Value Ref Range Status   06/15/2021 127 (H) 65 - 99 mg/dL Final   06/02/2021 154 (H) 65 - 99 mg/dL Final   04/28/2021 104 (H) 65 - 99 mg/dL Final     Glucose   Date Value Ref Range Status   08/05/2021 90 65 - 140 mg/dL Final     Comment:     If the patient is fasting, the ADA then defines impaired fasting glucose as > 100 mg/dL and diabetes as > or equal to 123 mg/dL  Specimen collection should occur prior to Sulfasalazine administration due to the potential for falsely depressed results  Specimen collection should occur prior to Sulfapyridine administration due to the potential for falsely elevated results  eGFR    Date Value Ref Range Status   06/15/2021 72 >59 mL/min/1 73 Final     Comment:     **[facility] currently reports eGFR in compliance with the current**    recommendations of the [employer]  [facility] will    update reporting as new guidelines are published from the NKF-ASN    Task force  06/02/2021 68 >59 mL/min/1 73 Final     Comment:     **[facility] currently reports eGFR in compliance with the current**    recommendations of the [employer]  [facility] will    update reporting as new guidelines are published from the NKF-ASN    Task force  04/28/2021 68 >59 mL/min/1 73 Final     Comment:     **[facility] currently reports eGFR in compliance with the current**    recommendations of the [employer]  [facility] will  update   reporting as new guidelines are published from the NKF-ASN  Task force         Calcium   Date Value Ref Range Status   08/05/2021 9 1 8 3 - 10 1 mg/dL Final     Albumin   Date Value Ref Range Status   08/05/2021 2 9 (L) 3 5 - 5 0 g/dL Final   06/15/2021 4 0 3 7 - 4 7 g/dL Final   06/02/2021 4 2 3 7 - 4 7 g/dL Final   04/28/2021 4 2 3 7 - 4 7 g/dL Final     TOTAL BILIRUBIN   Date Value Ref Range Status   06/15/2021 0 5 0 0 - 1 2 mg/dL Final   06/02/2021 0 5 0 0 - 1 2 mg/dL Final   04/28/2021 0 5 0 0 - 1 2 mg/dL Final     Total Bilirubin   Date Value Ref Range Status   08/05/2021 0 39 0 20 - 1 00 mg/dL Final     Comment:     Use of this assay is not recommended for patients undergoing treatment with eltrombopag due to the potential for falsely elevated results  Alkaline Phosphatase   Date Value Ref Range Status   08/05/2021 69 46 - 116 U/L Final     AST   Date Value Ref Range Status   08/05/2021 13 5 - 45 U/L Final     Comment:     Specimen collection should occur prior to Sulfasalazine administration due to the potential for falsely depressed results  06/15/2021 11 0 - 40 IU/L Final   06/02/2021 12 0 - 40 IU/L Final   04/28/2021 17 0 - 40 IU/L Final     ALT   Date Value Ref Range Status   08/05/2021 22 12 - 78 U/L Final     Comment:     Specimen collection should occur prior to Sulfasalazine and/or Sulfapyridine administration due to the potential for falsely depressed results  06/15/2021 17 0 - 44 IU/L Final   06/02/2021 10 0 - 44 IU/L Final   04/28/2021 13 0 - 44 IU/L Final      LD   Date Value Ref Range Status   08/05/2021 220 81 - 234 U/L Final     LDH   Date Value Ref Range Status   06/15/2021 129 121 - 224 IU/L Final   06/02/2021 119 (L) 121 - 224 IU/L Final   04/28/2021 141 121 - 224 IU/L Final     TSH   Date Value Ref Range Status   06/15/2021 0 233 (L) 0 450 - 4 500 uIU/mL Final   06/02/2021 0 272 (L) 0 450 - 4 500 uIU/mL Final   04/28/2021 11 300 (H) 0 450 - 4 500 uIU/mL Final     No results found for: [id number]   Free T4   Date Value Ref Range Status   08/05/2021 1 45 0 76 - 1 46 ng/dL Final     Comment:     Specimen collection should occur prior to Sulfasalazine administration due to the potential for falsely elevated results  RECENT IMAGING:  Procedure: NM pet ct tumor imaging whole body    Result Date: 8/3/2021  Narrative: WHOLE-BODY PET/CT SCAN INDICATION: C43 4: Malignant melanoma of scalp and neck     History of scalp melanoma, restaging examination during the course of chemotherapy/immunotherapy    The patient also has a known history of sarcoidosis including cardiac involvement MODIFIER: PS COMPARISON: 4/13/2021 CELL TYPE:  Ulcerated nodular malignant melanoma TECHNIQUE:   8 4 mCi F-18-FD administered IV  Multiplanar attenuation corrected and non attenuation corrected PET images were acquired 60 minutes post injection  Contiguous, low dose, axial CT sections were obtained from the skull vertex through the feet  Intravenous contrast material was not utilized  This examination, like all CT scans performed in the [facility], was performed utilizing techniques to minimize radiation dose exposure, including the use of iterative reconstruction  and automated exposure control  Fasting serum glucose: 116 mg/dl FINDINGS: VISUALIZED BRAIN:   No acute abnormalities are seen  HEAD/NECK:   There is a physiologic distribution of FDG  No FDG avid cervical adenopathy is seen  There is interval diminished activity in the thyroid gland CT images: Unremarkable CHEST:   Previous examination demonstrated hypermetabolic intrathoracic lymph nodes  A significant interval improvement has occurred  No areas of new or progressive change identified  Examples of improvement regions include a prior left sided subcarinal node with short axis diameter of 2 0 cm SUV max 24 2 now measuring 8 mm with no residual activity, and prior right hilar SUV max of 12 3, now 2 3  No residual foci of abnormal activity    CT images: No pleural or pericardial effusion  Coronary artery calcifications and a small hiatal hernia noted  Probable lung fibrosis noted without change  ABDOMEN:   As in the chest, there has been interval resolution of previously seen hypermetabolic change within upper abdominal lymph nodes  No areas of new or residual suspicious hypermetabolism  CT images: No hydronephrosis, bowel obstruction, or ascites  Moderately increased colonic stool  PELVIS: No FDG avid soft tissue lesions are seen  CT images: Sigmoid diverticulosis  Postsurgical changes in the pelvis   OSSEOUS STRUCTURES/EXTREMITIES: There is a lucent defect in the left posterior skull vertex for example image 2/32, unchanged in appearance since previous examination with a diameter of approximately 1 5 cm which is unchanged  It is also unchanged from an outside institution study dated 11/9/2020  Mild overlying scalp glucose avidity with SUV max of 3 1, unchanged from earlier  CT images: No other significant findings  Impression: 1  Left vertex calvarial defect unchanged since 11/9/2020  Mild activity in the adjacent overlying scalp  Overall appearance is unchanged from prior PET/CT  Correlate with clinical findings  2  There is no evidence of new distant glucose avid metastatic disease  3  Marked improvement of previously seen hypermetabolic lymph nodes in the chest and upper abdomen  Review of notes in Epic reveals patient underwent course of prednisone, which may have resulted in improvement of sarcoidosis  Workstation performed: [id number]            Assessment/Plan  Mr Margaret Costa is a 66 yr with Stage IIIC melanoma with adjuvant treatment with pembrolizumab which has been held due to side effects here for follow up and surveillance  Acquired hypothyroidism  Thought to be over replaced with levothyroxine  Saw endocrine and decreased dose  Will continue to monitor and defer to endocrine for titration  Adrenal insufficiency due to cancer therapy ([facility] )  On hydrocortisone and being monitored with endocrinology  Will monitor symptoms as well      High risk medication use  He is on treatment with immunotherapy and we will continue to monitor for side effects and lab abnormalities  We will hold treatment at this time  We will monitor labs with each treatment, and routinely as holding treatment, to ensure safety of continuing on treatment and monitoring side effects  He knows to watch for signs and symptoms for immune mediated side effects          Vitamin B deficiency  His Hgb is within the normal limits, however he continues to be profoundly fatigued, with two recent hospitalizations for these symtpoms  Had referred to endocrinology for work up in case missing something additional than hypothyroidism and adrenal insufficency, like hypotestosterone  Checked for iron and Vitamin B12/folate deficiency and Vit B12 levels low  Vit B12 injection today and oral Vit B12 cyanocobalmin supplementation  Will see if can get him weekly doses of Vit B12 injection x 3 and then monthly x 3 with PCP  Will continue to monitor symptoms and levels    Malignant melanoma of scalp ([facility] )  He is doing well and fatigued  Recent hospitalization due to profound fatigue  Received stress dose steroids and now on 40 mg daily - 30 mg in the morning and 10 mg in the evening  Will do slow, extended taper  Following with endocrinology  Vitamin B12 levels and will supplement with injections and oral medications  Will monitor fatigue and hopefully improvement  I understand that he is anxious to resume treatment, however, I am hesitant given his continued symptoms and profound fatigue, with unclear etiology  If it is truly [place] - as the drug can cause fatigue - and not from the medication side effects - hypothyroidism and adrenal insufficiency, would not want to continue to treat  Will continue to monitor him closely and decide if we can resume treatment  Discussed that his recent PET scan is negative for melanoma recurrence or metastatic disease  We will check labs and have him return in one month  He and his wife know to call with any issues or concerns        Erin Avalos MD, PhD

## 2021-08-12 NOTE — TELEPHONE ENCOUNTER
Called and spoke with [facility] from Dr Gomez [place] office, patient's PCP  Patient will receive his first Vitamin B12 injection at [facility] today, will receive the rest at his PCP's office  In total patient will receive B12 injections weekly x4, then monthly x3  Will fax over office visit note to Dr Megan Arthur office  Fax # [phone]  [facility] aware of plan

## 2021-08-12 NOTE — LETTER
August 12, 2021     Chelsey Hamm MD  [address] [facility]  [address] [facility]    Patient: Bartolo Malone   YOB: 1950   Date of Visit: 8/12/2021       Dear Dr Marilyn Campos: Thank you for referring Felicia Curran to me for evaluation  Below are my notes for this consultation  If you have questions, please do not hesitate to call me  I look forward to following your patient along with you  Sincerely,        Ce Tellez MD        CC: MD Mone Montoya MD Royetta Honour, MD Haskel Dustman, MD  8/12/2021  1:18 PM  Sign when Signing Visit  [address] [place] [address] 58  [phone]  [phone]     Date of Visit: 8/12/2021  Name: Bartolo Malone   YOB: 1950       Subjective    VISIT DIAGNOSIS:  Diagnoses and all orders for this visit:    Malignant melanoma of scalp ([facility] )    Acquired hypothyroidism    Adrenal insufficiency due to cancer therapy ([facility] )    High risk medication use    Vitamin B deficiency    Other orders  -     lactulose (CHRONULAC) 10 g/15 mL solution; DISSOLVE 30 MLS IN [place] [address] TWICE DAILY        Oncology History   Malignant melanoma of scalp ([address] )   5/15/2020 Initial Diagnosis    Malignant melanoma of scalp ([facility] [address] )     6/15/2020 -  Cancer Staged    Staging form: Melanoma of the Skin, AJCC 8th Edition  - Clinical stage from 6/15/2020: Stage IIB (cT3b, cN0, cM0) - Signed by Ce Tellez MD on 3/22/2021       10/26/2020 -  Cancer Staged    Staging form: Melanoma of the Skin, AJCC 8th Edition  - Pathologic stage from 10/26/2020: Stage IIIC (pT3b, pN1c, cM0) - Signed by Ce Tellez MD on 3/22/2021       11/2/2020 - 11/2/2020 Radiation    Performed at location closer to home    Radiation to scalp         12/28/2020 -  Chemotherapy    pembrolizumab (KEYTRUDA) IVPB, 400 mg (200 % of original dose 200 mg), Intravenous, Once, 5 of 9 cycles  Dose modification: 400 mg (original dose 200 mg, Cycle 1, Reason: Other (Must fill in a comment), Comment: new 6 week dosing)  Administration: 400 mg (5/6/2021)        Cancer Staging  Malignant melanoma of scalp ([facility] [address] )  Staging form: Melanoma of the Skin, AJCC 8th Edition  - Clinical stage from 6/15/2020: Stage IIB (cT3b, cN0, cM0) - Signed by Elle Gavin MD on 3/22/2021  - Pathologic stage from 10/26/2020: Stage IIIC (pT3b, pN1c, cM0) - Signed by Elle Gavin MD on 3/22/2021     Treatment Details   Treatment goal Curative   Plan Name OP Pembrolizumab Q 42 Days   Status Active   Start Date 12/28/2020   End Date 1/13/2022 (Planned)   Provider Elle Gavin MD   Chemotherapy pembrolizumab (KEYTRUDA) IVPB, 400 mg (200 % of original dose 200 mg), Intravenous, Once, 5 of 9 cycles  Dose modification: 400 mg (original dose 200 mg, Cycle 1, Reason: Other (Must fill in a comment), Comment: new 6 week dosing)  Administration: 400 mg (5/6/2021)       Treatment Details   Treatment goal [No plan goal]   Plan Name VITAMIN B12 INJECTION (CYANOCOBALAMIN)   Status Active   Start Date 8/12/2021   End Date Until discontinued   Provider Elle Gavin MD   Chemotherapy cyanocobalamin, 1,000 mcg, Intramuscular, Once, 1 of 1 cycle  Administration: 1,000 mcg (8/12/2021)          HISTORY OF PRESENT ILLNESS: Bela Recio is a 66 y o  male  who has Stage IIIC melanoma on adjvuant treatment with pembrolizumab which has been on hold for some time now due to side effects  He was recently admitted again to the hospital with profound fatigue and not eating or drinking  Extensive work up negative for underlying cause other than known hypothyroidism and adrenal insufficiency  At one time thought to have GI bleed/ulcer, which was negative    He did receive stress dose steroids and is now on 40 mg prednisone daily - 30 mg prednisone in the AM and 10 mg prednisone in the PM   He has established care with endocrinology and will see them again in 6 weeks, as they reduced the dose of his levothyroxine  He did see his dermatologist 2 months ago and all was fine at that visit  Recently followed up with radiation oncology, as well  He is feeling better now for two weeks since discharge and eating regular meals  He is putting on a few pounds  He is bruising more easily and I explained that is due to the steroids  He denies any new, changing, or concerning lesions  He denies headaches, double vision, rash, itching and diarrhea  We discussed his recent PET scan that demonstrated stable area on his scalp and no evidence of distant metastatic melanoma  REVIEW OF SYSTEMS:  Review of Systems   Constitutional: Positive for appetite change (improving), fatigue (improving) and unexpected weight change (improving)  Negative for fever  HENT:   Negative for lump/mass  Eyes: Negative for icterus  Respiratory: Negative for cough, shortness of breath and wheezing  Cardiovascular: Negative for leg swelling  Gastrointestinal: Negative for abdominal pain, constipation, diarrhea, nausea and vomiting  Genitourinary: Negative for difficulty urinating and hematuria  Musculoskeletal: Negative for arthralgias, gait problem and myalgias  Skin: Negative for itching and rash  No new, changing, or concerning lesions  Neurological: Negative for extremity weakness, gait problem, headaches, light-headedness and numbness  Hematological: Negative for adenopathy  Bruises/bleeds easily          MEDICATIONS:    Current Outpatient Medications:     Aspirin Buf,CaCarb-MgCarb-MgO, 81 MG TABS, Take 81 mg by mouth, Disp: , Rfl:     benzonatate (TESSALON PERLES) 100 mg capsule, Take 100 mg by mouth 3 (three) times a day as needed for cough, Disp: , Rfl:     betamethasone dipropionate (DIPROSONE) 0 05 % cream, Apply topically 2 (two) times a day, Disp: , Rfl:     cyanocobalamin (VITAMIN B-12) 1000 MCG tablet, Take 1 tablet (1,000 mcg total) by mouth daily, Disp: 30 tablet, Rfl: 3    fludrocortisone (FLORINEF) 0 1 mg tablet, Take 0 1 mg by mouth every morning, Disp: , Rfl:     gemfibrozil (LOPID) 600 mg tablet, Take 600 mg by mouth, Disp: , Rfl:     lactulose (CHRONULAC) 10 g/15 mL solution, DISSOLVE 30 MLS IN JUICE/WATER AND DRINK TWICE DAILY, Disp: , Rfl:     levothyroxine 100 mcg tablet, Take 1 tablet (100 mcg total) by mouth daily, Disp: 60 tablet, Rfl: 0    levothyroxine 125 mcg tablet, Take 1 tablet (125 mcg total) by mouth daily, Disp: 30 tablet, Rfl: 5    ondansetron (ZOFRAN) 4 mg tablet, Take 1 tablet (4 mg total) by mouth every 8 (eight) hours as needed for nausea or vomiting, Disp: 30 tablet, Rfl: 5    pantoprazole (PROTONIX) 40 mg tablet, Take 40 mg by mouth daily, Disp: , Rfl:     predniSONE 20 mg tablet, Take 1 tablet (20 mg total) by mouth 3 (three) times a day (Patient taking differently: Taking 30 mg am and 10 mg pm), Disp: 30 tablet, Rfl: 2    prochlorperazine (COMPAZINE) 10 mg tablet, Take 1 tablet (10 mg total) by mouth every 6 (six) hours as needed for nausea or vomiting, Disp: 30 tablet, Rfl: 2    simvastatin (ZOCOR) 40 mg tablet, Take 40 mg by mouth, Disp: , Rfl:     SUMAtriptan (IMITREX) 50 mg tablet, Take 50 mg by mouth once as needed , Disp: , Rfl:   No current facility-administered medications for this visit       ALLERGIES:  Allergies   Allergen Reactions    Chocolate - Food Allergy Other (See Comments)    Iodinated Diagnostic Agents Hives     CT contrast dye      Penicillins Hives    Sulfa Antibiotics Hives    Banana - Food Allergy Headache        ACTIVE PROBLEMS:  Patient Active Problem List   Diagnosis    [facility]    Acquired hypothyroidism    Coronary arteriosclerosis    RBBB    Sarcoidosis    Seizures ([facility] )    Adrenal insufficiency due to cancer therapy ([facility] )    High risk medication use    Nausea    Vitamin B deficiency          PAST MEDICAL HISTORY: Past Medical History:   Diagnosis Date    Migraines     Prostate cancer ([facility] [address] )     Sarcoidosis     Skin cancer         PAST SURGICAL HISTORY:  Past Surgical History:   Procedure Laterality Date    HERNIA REPAIR      PROSTATE SURGERY          SOCIAL HISTORY:  Social History     Socioeconomic History    Marital status: /Civil Union     Spouse name: Not on file    Number of children: Not on file    Years of education: Not on file    Highest education level: Not on file   Occupational History    Not on file   Tobacco Use    Smoking status: Former Smoker     Quit date:      Years since quittin 6    Smokeless tobacco: Never Used   Vaping Use    Vaping Use: Never used   Substance and Sexual Activity    Alcohol use: Not Currently    Drug use: Never    Sexual activity: Not on file   Other Topics Concern    Not on file   Social History Narrative    Not on file     Social Determinants of Health     Financial Resource Strain:     Difficulty of Paying Living Expenses:    Food Insecurity:     Worried About [address] in the Last Year:     [address] in the Last Year:    Transportation Needs:     Lack of Transportation (Medical):  Lack of Transportation (Non-Medical):    Physical Activity:     Days of Exercise per Week:     Minutes of Exercise per Session:    Stress:     Feeling of Stress :    Social Connections:     Frequency of Communication with Friends and Family:     Frequency of Social Gatherings with Friends and Family:     Attends Anabaptist Services:     Active Member of Clubs or Organizations:     Attends Club or Organization Meetings:     Marital Status:    Intimate Partner Violence:     Fear of Current or Ex-Partner:     Emotionally Abused:     Physically Abused:     Sexually Abused:         FAMILY HISTORY:  No family history on file         Objective    PHYSICAL EXAMINATION:   Blood pressure 112/72, pulse 89, temperature (!) 97 4 °F (36 3 °C), temperature source Temporal, resp  rate 16, height 5' 9 1" (1 755 m), weight 71 2 kg (157 lb), SpO2 97 %  ECOG Performance Status      Most Recent Value   ECOG Performance Status  1 - Restricted in physically strenuous activity but ambulatory and able to carry out work of a light or sedentary nature, e g , light house work, office work             Physical Exam  Constitutional:       General: He is not in acute distress  Appearance: Normal appearance  He is not toxic-appearing  HENT:      Mouth/Throat:      Mouth: Mucous membranes are moist       Pharynx: Oropharynx is clear  Eyes:      General: No scleral icterus  Cardiovascular:      Rate and Rhythm: Normal rate and regular rhythm  Pulses: Normal pulses  Heart sounds: No murmur heard  No friction rub  No gallop  Pulmonary:      Effort: Pulmonary effort is normal  No respiratory distress  Breath sounds: Normal breath sounds  No wheezing or rales  Abdominal:      General: There is no distension  Palpations: There is no mass  Tenderness: There is no abdominal tenderness  There is no rebound  Musculoskeletal:         General: No swelling or tenderness  Right lower leg: No edema  Left lower leg: No edema  Lymphadenopathy:      Head:      Right side of head: No submandibular, preauricular or posterior auricular adenopathy  Left side of head: No submandibular, preauricular or posterior auricular adenopathy  Cervical: No cervical adenopathy  Right cervical: No superficial or posterior cervical adenopathy  Left cervical: No superficial or posterior cervical adenopathy  Upper Body:      Right upper body: No supraclavicular or axillary adenopathy  Left upper body: No supraclavicular or axillary adenopathy  Lower Body: No right inguinal adenopathy  No left inguinal adenopathy  Skin:     Findings: Bruising (bilateral arms) present  No rash  Comments: Well healed surgical scar  No evidence of recurrence at primary site  Healing bruise from bumping his head  Neurological:      General: No focal deficit present  Mental Status: He is alert and oriented to person, place, and time  Psychiatric:         Mood and Affect: Mood normal          Behavior: Behavior normal          Thought Content: Thought content normal          Judgment: Judgment normal          I reviewed lab data in the chart      WBC   Date Value Ref Range Status   08/05/2021 12 53 (H) 4 31 - 10 16 Thousand/uL Final     White Blood Cell Count   Date Value Ref Range Status   06/15/2021 10 8 3 4 - 10 8 x10E3/uL Final   06/02/2021 5 8 3 4 - 10 8 x10E3/uL Final   04/28/2021 5 7 3 4 - 10 8 x10E3/uL Final     Hemoglobin   Date Value Ref Range Status   08/05/2021 14 9 12 0 - 17 0 g/dL Final   06/15/2021 15 2 13 0 - 17 7 g/dL Final   06/02/2021 15 4 13 0 - 17 7 g/dL Final   04/28/2021 14 4 13 0 - 17 7 g/dL Final     Platelet Count   Date Value Ref Range Status   06/15/2021 267 150 - 450 x10E3/uL Final   06/02/2021 364 150 - 450 x10E3/uL Final   04/28/2021 251 150 - 450 x10E3/uL Final     Platelets   Date Value Ref Range Status   08/05/2021 284 149 - 390 Thousands/uL Final     MCV   Date Value Ref Range Status   08/05/2021 93 82 - 98 fL Final   06/15/2021 86 79 - 97 fL Final   06/02/2021 86 79 - 97 fL Final   04/28/2021 85 79 - 97 fL Final      Potassium   Date Value Ref Range Status   08/05/2021 4 2 3 5 - 5 3 mmol/L Final   06/15/2021 4 9 3 5 - 5 2 mmol/L Final   06/02/2021 4 8 3 5 - 5 2 mmol/L Final   04/28/2021 4 6 3 5 - 5 2 mmol/L Final     Chloride   Date Value Ref Range Status   08/05/2021 98 (L) 100 - 108 mmol/L Final   06/15/2021 97 96 - 106 mmol/L Final   06/02/2021 91 (L) 96 - 106 mmol/L Final   04/28/2021 97 96 - 106 mmol/L Final     CO2   Date Value Ref Range Status   08/05/2021 25 21 - 32 mmol/L Final   06/15/2021 21 20 - 29 mmol/L Final   06/02/2021 19 (L) 20 - 29 mmol/L Final   04/28/2021 23 20 - 29 mmol/L Final BUN   Date Value Ref Range Status   08/05/2021 20 5 - 25 mg/dL Final   06/15/2021 30 (H) 8 - 27 mg/dL Final   06/02/2021 21 8 - 27 mg/dL Final   04/28/2021 19 8 - 27 mg/dL Final     Creatinine   Date Value Ref Range Status   08/05/2021 1 00 0 60 - 1 30 mg/dL Final     Comment:     Standardized to IDMS reference method   06/15/2021 1 16 0 76 - 1 27 mg/dL Final   06/02/2021 1 22 0 76 - 1 27 mg/dL Final   04/28/2021 1 22 0 76 - 1 27 mg/dL Final     Glucose, Random   Date Value Ref Range Status   06/15/2021 127 (H) 65 - 99 mg/dL Final   06/02/2021 154 (H) 65 - 99 mg/dL Final   04/28/2021 104 (H) 65 - 99 mg/dL Final     Glucose   Date Value Ref Range Status   08/05/2021 90 65 - 140 mg/dL Final     Comment:     If the patient is fasting, the ADA then defines impaired fasting glucose as > 100 mg/dL and diabetes as > or equal to 123 mg/dL  Specimen collection should occur prior to Sulfasalazine administration due to the potential for falsely depressed results  Specimen collection should occur prior to Sulfapyridine administration due to the potential for falsely elevated results  eGFR    Date Value Ref Range Status   06/15/2021 72 >59 mL/min/1 73 Final     Comment:     **[facility] currently reports eGFR in compliance with the current**    recommendations of the [employer]  Lenoria [employer] will    update reporting as new guidelines are published from the NKF-ASN    Task force  06/02/2021 68 >59 mL/min/1 73 Final     Comment:     **[facility] currently reports eGFR in compliance with the current**    recommendations of the [employer] will    update reporting as new guidelines are published from the NKF-ASN    Task force  04/28/2021 68 >59 mL/min/1 73 Final     Comment:     **[facility] currently reports eGFR in compliance with the current**    recommendations of the [employer]   Lenoria [employer]s will  update   reporting as new guidelines are published from the NKF-ASN  Task force  Calcium   Date Value Ref Range Status   08/05/2021 9 1 8 3 - 10 1 mg/dL Final     Albumin   Date Value Ref Range Status   08/05/2021 2 9 (L) 3 5 - 5 0 g/dL Final   06/15/2021 4 0 3 7 - 4 7 g/dL Final   06/02/2021 4 2 3 7 - 4 7 g/dL Final   04/28/2021 4 2 3 7 - 4 7 g/dL Final     TOTAL BILIRUBIN   Date Value Ref Range Status   06/15/2021 0 5 0 0 - 1 2 mg/dL Final   06/02/2021 0 5 0 0 - 1 2 mg/dL Final   04/28/2021 0 5 0 0 - 1 2 mg/dL Final     Total Bilirubin   Date Value Ref Range Status   08/05/2021 0 39 0 20 - 1 00 mg/dL Final     Comment:     Use of this assay is not recommended for patients undergoing treatment with eltrombopag due to the potential for falsely elevated results  Alkaline Phosphatase   Date Value Ref Range Status   08/05/2021 69 46 - 116 U/L Final     AST   Date Value Ref Range Status   08/05/2021 13 5 - 45 U/L Final     Comment:     Specimen collection should occur prior to Sulfasalazine administration due to the potential for falsely depressed results  06/15/2021 11 0 - 40 IU/L Final   06/02/2021 12 0 - 40 IU/L Final   04/28/2021 17 0 - 40 IU/L Final     ALT   Date Value Ref Range Status   08/05/2021 22 12 - 78 U/L Final     Comment:     Specimen collection should occur prior to Sulfasalazine and/or Sulfapyridine administration due to the potential for falsely depressed results      06/15/2021 17 0 - 44 IU/L Final   06/02/2021 10 0 - 44 IU/L Final   04/28/2021 13 0 - 44 IU/L Final      LD   Date Value Ref Range Status   08/05/2021 220 81 - 234 U/L Final     LDH   Date Value Ref Range Status   06/15/2021 129 121 - 224 IU/L Final   06/02/2021 119 (L) 121 - 224 IU/L Final   04/28/2021 141 121 - 224 IU/L Final     TSH   Date Value Ref Range Status   06/15/2021 0 233 (L) 0 450 - 4 500 uIU/mL Final   06/02/2021 0 272 (L) 0 450 - 4 500 uIU/mL Final   04/28/2021 11 300 (H) 0 450 - 4 500 uIU/mL Final     No results found for: [id number]   Free T4   Date Value Ref Range Status   08/05/2021 1 45 0 76 - 1 46 ng/dL Final     Comment:     Specimen collection should occur prior to Sulfasalazine administration due to the potential for falsely elevated results  RECENT IMAGING:  Procedure: NM pet ct tumor imaging whole body    Result Date: 8/3/2021  Narrative: WHOLE-BODY PET/CT SCAN INDICATION: C43 4: Malignant melanoma of scalp and neck     History of scalp melanoma, restaging examination during the course of chemotherapy/immunotherapy  The patient also has a known history of sarcoidosis including cardiac involvement MODIFIER: PS COMPARISON: 4/13/2021 CELL TYPE:  Ulcerated nodular malignant melanoma TECHNIQUE:   8 4 mCi F-18-FD administered IV  Multiplanar attenuation corrected and non attenuation corrected PET images were acquired 60 minutes post injection  Contiguous, low dose, axial CT sections were obtained from the skull vertex through the feet  Intravenous contrast material was not utilized  This examination, like all CT scans performed in the [facility], was performed utilizing techniques to minimize radiation dose exposure, including the use of iterative reconstruction  and automated exposure control  Fasting serum glucose: 116 mg/dl FINDINGS: VISUALIZED BRAIN:   No acute abnormalities are seen  HEAD/NECK:   There is a physiologic distribution of FDG  No FDG avid cervical adenopathy is seen  There is interval diminished activity in the thyroid gland CT images: Unremarkable CHEST:   Previous examination demonstrated hypermetabolic intrathoracic lymph nodes  A significant interval improvement has occurred  No areas of new or progressive change identified  Examples of improvement regions include a prior left sided subcarinal node with short axis diameter of 2 0 cm SUV max 24 2 now measuring 8 mm with no residual activity, and prior right hilar SUV max of 12 3, now 2 3  No residual foci of abnormal activity    CT images: No pleural or pericardial effusion  Coronary artery calcifications and a small hiatal hernia noted  Probable lung fibrosis noted without change  ABDOMEN:   As in the chest, there has been interval resolution of previously seen hypermetabolic change within upper abdominal lymph nodes  No areas of new or residual suspicious hypermetabolism  CT images: No hydronephrosis, bowel obstruction, or ascites  Moderately increased colonic stool  PELVIS: No FDG avid soft tissue lesions are seen  CT images: Sigmoid diverticulosis  Postsurgical changes in the pelvis  OSSEOUS STRUCTURES/EXTREMITIES: There is a lucent defect in the left posterior skull vertex for example image 2/32, unchanged in appearance since previous examination with a diameter of approximately 1 5 cm which is unchanged  It is also unchanged from an outside institution study dated 11/9/2020  Mild overlying scalp glucose avidity with SUV max of 3 1, unchanged from earlier  CT images: No other significant findings  Impression: 1  Left vertex calvarial defect unchanged since 11/9/2020  Mild activity in the adjacent overlying scalp  Overall appearance is unchanged from prior PET/CT  Correlate with clinical findings  2  There is no evidence of new distant glucose avid metastatic disease  3  Marked improvement of previously seen hypermetabolic lymph nodes in the chest and upper abdomen  Review of notes in Epic reveals patient underwent course of prednisone, which may have resulted in improvement of sarcoidosis  Workstation performed: [id number]            Assessment/Plan  Mr Fly Gil is a 66 yr with Stage IIIC melanoma with adjuvant treatment with pembrolizumab which has been held due to side effects here for follow up and surveillance  Acquired hypothyroidism  Thought to be over replaced with levothyroxine  Saw endocrine and decreased dose  Will continue to monitor and defer to endocrine for titration        Adrenal insufficiency due to cancer therapy ([facility] )  On hydrocortisone and being monitored with endocrinology  Will monitor symptoms as well      High risk medication use  He is on treatment with immunotherapy and we will continue to monitor for side effects and lab abnormalities  We will hold treatment at this time  We will monitor labs with each treatment, and routinely as holding treatment, to ensure safety of continuing on treatment and monitoring side effects  He knows to watch for signs and symptoms for immune mediated side effects  Vitamin B deficiency  His Hgb is within the normal limits, however he continues to be profoundly fatigued, with two recent hospitalizations for these symtpoms  Had referred to endocrinology for work up in case missing something additional than hypothyroidism and adrenal insufficency, like hypotestosterone  Checked for iron and Vitamin B12/folate deficiency and Vit B12 levels low  Vit B12 injection today and oral Vit B12 cyanocobalmin supplementation  Will see if can get him weekly doses of Vit B12 injection x 3 and then monthly x 3 with PCP  Will continue to monitor symptoms and levels    Malignant melanoma of scalp ([facility] )  He is doing well and fatigued  Recent hospitalization due to profound fatigue  Received stress dose steroids and now on 40 mg daily - 30 mg in the morning and 10 mg in the evening  Will do slow, extended taper  Following with endocrinology  Vitamin B12 levels and will supplement with injections and oral medications  Will monitor fatigue and hopefully improvement  I understand that he is anxious to resume treatment, however, I am hesitant given his continued symptoms and profound fatigue, with unclear etiology  If it is truly [place] - as the drug can cause fatigue - and not from the medication side effects - hypothyroidism and adrenal insufficiency, would not want to continue to treat  Will continue to monitor him closely and decide if we can resume treatment      Discussed that his recent PET scan is negative for melanoma recurrence or metastatic disease  We will check labs and have him return in one month  He and his wife know to call with any issues or concerns        Roseanne Kendrick MD, PhD

## 2021-08-12 NOTE — ASSESSMENT & PLAN NOTE
He is doing well and fatigued  Recent hospitalization due to profound fatigue  Received stress dose steroids and now on 40 mg daily - 30 mg in the morning and 10 mg in the evening  Will do slow, extended taper  Following with endocrinology  Vitamin B12 levels and will supplement with injections and oral medications  Will monitor fatigue and hopefully improvement  I understand that he is anxious to resume treatment, however, I am hesitant given his continued symptoms and profound fatigue, with unclear etiology  If it is truly [place] - as the drug can cause fatigue - and not from the medication side effects - hypothyroidism and adrenal insufficiency, would not want to continue to treat  Will continue to monitor him closely and decide if we can resume treatment  Discussed that his recent PET scan is negative for melanoma recurrence or metastatic disease  We will check labs and have him return in one month  He and his wife know to call with any issues or concerns

## 2021-08-12 NOTE — TELEPHONE ENCOUNTER
[facility] is calling from [address] and would like to request Dr Shakira Acuna last office notes and copy of NM PET scan to be faxed over to them at [phone] and best call back # [phone]

## 2021-08-12 NOTE — ASSESSMENT & PLAN NOTE
His Hgb is within the normal limits, however he continues to be profoundly fatigued, with two recent hospitalizations for these symtpoms  Had referred to endocrinology for work up in case missing something additional than hypothyroidism and adrenal insufficency, like hypotestosterone  Checked for iron and Vitamin B12/folate deficiency and Vit B12 levels low  Vit B12 injection today and oral Vit B12 cyanocobalmin supplementation  Will see if can get him weekly doses of Vit B12 injection x 3 and then monthly x 3 with PCP      Will continue to monitor symptoms and levels

## 2021-08-13 LAB
TESTOST FREE SERPL-MCNC: 1.9 PG/ML (ref 6.6–18.1)
TESTOST SERPL-MCNC: 143 NG/DL (ref 264–916)

## 2021-08-17 ENCOUNTER — TELEPHONE (OUTPATIENT)
Dept: ENDOCRINOLOGY | Facility: CLINIC | Age: 71
End: 2021-08-17

## 2021-08-17 DIAGNOSIS — E03.9 ACQUIRED HYPOTHYROIDISM: Primary | ICD-10-CM

## 2021-08-17 NOTE — TELEPHONE ENCOUNTER
He has low testosterone levels  Check FSH, LH, prolactin, total and free testosterone level  He may require treatment with testosterone depending on what the new laboratory testing shows

## 2021-08-20 LAB
FSH SERPL-ACNC: 2.4 MIU/ML (ref 1.5–12.4)
LH SERPL-ACNC: 2.7 MIU/ML (ref 1.7–8.6)
PROLACTIN SERPL-MCNC: 25.4 NG/ML (ref 4–15.2)
TESTOST FREE SERPL-MCNC: 4 PG/ML (ref 6.6–18.1)
TESTOST SERPL-MCNC: 168 NG/DL (ref 264–916)

## 2021-08-23 NOTE — RESULT ENCOUNTER NOTE
Testosterone level is low and the prolactin level is high  Would recommend MRI of the pituitary  Based on the results of the MRI, we can discuss testosterone therapy        Sent to my chart

## 2021-08-30 ENCOUNTER — TELEPHONE (OUTPATIENT)
Dept: ENDOCRINOLOGY | Facility: CLINIC | Age: 71
End: 2021-08-30

## 2021-08-30 NOTE — TELEPHONE ENCOUNTER
Patient is still worried because of his history of prostate cancer, he will wait and discuss this further at next office visit in [place]

## 2021-08-30 NOTE — TELEPHONE ENCOUNTER
----- Message from Roger Ortega MD sent at 8/30/2021  7:58 AM EDT -----  There are several different ways of taking testosterone  This includes oral, gel and injection  This may actually help your tiredness

## 2021-09-16 LAB
T4 FREE SERPL-MCNC: 1.48 NG/DL (ref 0.82–1.77)
TSH SERPL DL<=0.005 MIU/L-ACNC: 1.62 UIU/ML (ref 0.45–4.5)

## 2021-09-17 ENCOUNTER — TELEPHONE (OUTPATIENT)
Dept: ENDOCRINOLOGY | Facility: CLINIC | Age: 71
End: 2021-09-17

## 2021-09-17 NOTE — TELEPHONE ENCOUNTER
----- Message from Doreen Shine MD sent at 9/17/2021 12:55 PM EDT -----  The laboratory testing results are normal   The thyroid levels are normal       Sent to my chart

## 2021-09-20 ENCOUNTER — OFFICE VISIT (OUTPATIENT)
Dept: HEMATOLOGY ONCOLOGY | Facility: CLINIC | Age: 71
End: 2021-09-20
Payer: MEDICARE

## 2021-09-20 VITALS
WEIGHT: 164 LBS | HEIGHT: 69 IN | HEART RATE: 84 BPM | SYSTOLIC BLOOD PRESSURE: 126 MMHG | DIASTOLIC BLOOD PRESSURE: 80 MMHG | BODY MASS INDEX: 24.29 KG/M2 | RESPIRATION RATE: 14 BRPM

## 2021-09-20 DIAGNOSIS — R11.0 NAUSEA: ICD-10-CM

## 2021-09-20 DIAGNOSIS — F32.A DEPRESSION, UNSPECIFIED DEPRESSION TYPE: ICD-10-CM

## 2021-09-20 DIAGNOSIS — E27.3 ADRENAL INSUFFICIENCY DUE TO CANCER THERAPY (HCC): ICD-10-CM

## 2021-09-20 DIAGNOSIS — R53.81 PHYSICAL DECONDITIONING: ICD-10-CM

## 2021-09-20 DIAGNOSIS — E03.9 ACQUIRED HYPOTHYROIDISM: ICD-10-CM

## 2021-09-20 DIAGNOSIS — C43.4 MALIGNANT MELANOMA OF SCALP (HCC): Primary | ICD-10-CM

## 2021-09-20 PROCEDURE — 99215 OFFICE O/P EST HI 40 MIN: CPT | Performed by: INTERNAL MEDICINE

## 2021-09-20 RX ORDER — PREDNISONE 20 MG/1
TABLET ORAL
COMMUNITY
Start: 2021-07-28

## 2021-09-20 RX ORDER — PREDNISONE 10 MG/1
TABLET ORAL
COMMUNITY
Start: 2021-07-28

## 2021-09-20 RX ORDER — ESCITALOPRAM OXALATE 10 MG/1
10 TABLET ORAL DAILY
Qty: 30 TABLET | Refills: 1 | Status: SHIPPED | OUTPATIENT
Start: 2021-09-20

## 2021-09-20 NOTE — LETTER
September 21, 2021     Berenice Youssef MD  [id number] [facility]  [address] [facility]    Patient: Laurie Vergara   YOB: 1950   Date of Visit: 9/20/2021       Dear Dr Royal Dean: Thank you for referring Chary Ramon to me for evaluation  Below are my notes for this consultation  If you have questions, please do not hesitate to call me  I look forward to following your patient along with you  Sincerely,        Blayne Conklin MD        CC: Tally Leventhal, MD Laveda Chary, MD Katherene Georgia, MD Daril Maudlin, MD  9/21/2021  7:07 PM  Sign when Signing Visit  [address] [place] [address]  [phone]     Date of Visit: 9/20/2021  Name: Laurie Vergara   YOB: 1950        Subjective    VISIT DIAGNOSIS:  Diagnoses and all orders for this visit:    Malignant melanoma of scalp ([facility] )  -     NM pet ct tumor imaging whole body; Future    Physical deconditioning  -     Ambulatory referral to Physical Therapy; Future    Adrenal insufficiency due to cancer therapy [facility])  -     Ambulatory referral to Physical Therapy; Future    Depression, unspecified depression type  -     escitalopram (LEXAPRO) 10 mg tablet;  Take 1 tablet (10 mg total) by mouth daily    Acquired hypothyroidism    Nausea    Other orders  -     predniSONE 10 mg tablet  -     predniSONE 20 mg tablet        Oncology History   Malignant melanoma of scalp ([facility] )   5/15/2020 Initial Diagnosis    Malignant melanoma of scalp ([facility] )     6/15/2020 -  Cancer Staged    Staging form: Melanoma of the Skin, AJCC 8th Edition  - Clinical stage from 6/15/2020: Stage IIB (cT3b, cN0, cM0) - Signed by Blayne Conklin MD on 3/22/2021       10/26/2020 -  Cancer Staged    Staging form: Melanoma of the Skin, AJCC 8th Edition  - Pathologic stage from 10/26/2020: Stage IIIC (pT3b, pN1c, cM0) - Signed by Blayne Conklin MD on 3/22/2021 11/2/2020 - 11/2/2020 Radiation    Performed at location closer to home  Radiation to scalp         12/28/2020 -  Chemotherapy    pembrolizumab (KEYTRUDA) IVPB, 400 mg (200 % of original dose 200 mg), Intravenous, Once, 5 of 9 cycles  Dose modification: 400 mg (original dose 200 mg, Cycle 1, Reason: Other (Must fill in a comment), Comment: new 6 week dosing)  Administration: 400 mg (5/6/2021)        Cancer Staging  Malignant melanoma of scalp ([facility] )  Staging form: Melanoma of the Skin, AJCC 8th Edition  - Clinical stage from 6/15/2020: Stage IIB (cT3b, cN0, cM0) - Signed by Lillian Bach MD on 3/22/2021  - Pathologic stage from 10/26/2020: Stage IIIC (pT3b, pN1c, cM0) - Signed by Lillian Bach MD on 3/22/2021     Treatment Details   Treatment goal Curative   Plan Name OP Pembrolizumab Q 42 Days   Status Active   Start Date 12/28/2020   End Date 1/13/2022 (Planned)   Provider Lillian Bach MD   Chemotherapy pembrolizumab (KEYTRUDA) IVPB, 400 mg (200 % of original dose 200 mg), Intravenous, Once, 5 of 9 cycles  Dose modification: 400 mg (original dose 200 mg, Cycle 1, Reason: Other (Must fill in a comment), Comment: new 6 week dosing)  Administration: 400 mg (5/6/2021)       Treatment Details   Treatment goal [No plan goal]   Plan Name VITAMIN B12 INJECTION (CYANOCOBALAMIN)   Status Active   Start Date 8/12/2021   End Date Until discontinued   Provider Lillian Bach MD   Chemotherapy cyanocobalamin, 1,000 mcg, Intramuscular, Once, 1 of 1 cycle  Administration: 1,000 mcg (8/12/2021)          HISTORY OF PRESENT ILLNESS: Maryjane Villa is a 66 y o  male  who has stage IIIC melanoma on adjuvant treatment with pembrolizumab which has been on hold due to profound fatigue and associated symptoms here for follow-up  He is doing okay since his last visit    His energy does continue to improve, though he still feels fatigued but not to the extent that it was previously, resulting in 2 hospitalizations  He also describes lack of desire or initiative to get up and do things  States that he thinks of " I need to do X, but then sits there and does not do X "  He is following with endocrinology  He did have labs demonstrating low testosterone levels  There was discussion with supplementation, though he is extremely hesitant given that he has had prostate cancer in the past   He is currently on 30 mg of prednisone-20 mg in the morning and 10 mg at night  He also feels like he has a metallic taste in his mouth and sometimes food does not taste good to him  He is eating and he does have an appetite and he has put back on some of the weight that he has loss  He does describe having muscle weakness in his legs despite trying to walk further and be more active  He also still has his tremors  He isn't able to stay asleep at night as well  He states he gets up to urinate multiple times in the evening, and then is unable to fall back asleep  He is then tired during the day and needs to take a nap  He denies any new, changing, or concerning lesions  Continues to follow with his doctor colleges  He denies any headaches, vision, rash, itching, or diarrhea  REVIEW OF SYSTEMS:  Review of Systems   Constitutional: Positive for appetite change, fatigue and unexpected weight change (improving)  Negative for fever  HENT:   Negative for lump/mass  Eyes: Negative for icterus  Respiratory: Negative for cough, shortness of breath and wheezing  Cardiovascular: Negative for leg swelling  Gastrointestinal: Negative for abdominal pain, constipation, diarrhea, nausea and vomiting  Genitourinary: Positive for nocturia  Negative for difficulty urinating and hematuria  Musculoskeletal: Negative for arthralgias, gait problem and myalgias  Skin: Negative for itching and rash  No new, changing, or concerning lesions  Neurological: Positive for extremity weakness   Negative for gait problem, headaches, light-headedness and numbness  Hematological: Negative for adenopathy  Psychiatric/Behavioral: Positive for depression and sleep disturbance          MEDICATIONS:    Current Outpatient Medications:     Aspirin Buf,CaCarb-MgCarb-MgO, 81 MG TABS, Take 81 mg by mouth, Disp: , Rfl:     benzonatate (TESSALON PERLES) 100 mg capsule, Take 100 mg by mouth 3 (three) times a day as needed for cough, Disp: , Rfl:     betamethasone dipropionate (DIPROSONE) 0 05 % cream, Apply topically 2 (two) times a day, Disp: , Rfl:     cyanocobalamin (VITAMIN B-12) 1000 MCG tablet, Take 1 tablet (1,000 mcg total) by mouth daily, Disp: 30 tablet, Rfl: 3    fludrocortisone (FLORINEF) 0 1 mg tablet, Take 0 1 mg by mouth every morning, Disp: , Rfl:     gemfibrozil (LOPID) 600 mg tablet, Take 600 mg by mouth, Disp: , Rfl:     lactulose (CHRONULAC) 10 g/15 mL solution, DISSOLVE 30 MLS IN JUICE/WATER AND DRINK TWICE DAILY, Disp: , Rfl:     levothyroxine 125 mcg tablet, Take 1 tablet (125 mcg total) by mouth daily, Disp: 30 tablet, Rfl: 5    ondansetron (ZOFRAN) 4 mg tablet, Take 1 tablet (4 mg total) by mouth every 8 (eight) hours as needed for nausea or vomiting, Disp: 30 tablet, Rfl: 5    pantoprazole (PROTONIX) 40 mg tablet, Take 40 mg by mouth daily, Disp: , Rfl:     predniSONE 10 mg tablet, , Disp: , Rfl:     predniSONE 20 mg tablet, , Disp: , Rfl:     prochlorperazine (COMPAZINE) 10 mg tablet, Take 1 tablet (10 mg total) by mouth every 6 (six) hours as needed for nausea or vomiting, Disp: 30 tablet, Rfl: 2    simvastatin (ZOCOR) 40 mg tablet, Take 40 mg by mouth, Disp: , Rfl:     SUMAtriptan (IMITREX) 50 mg tablet, Take 50 mg by mouth once as needed , Disp: , Rfl:     escitalopram (LEXAPRO) 10 mg tablet, Take 1 tablet (10 mg total) by mouth daily, Disp: 30 tablet, Rfl: 1     ALLERGIES:  Allergies   Allergen Reactions    Chocolate - Food Allergy Other (See Comments)    Iodinated Diagnostic Agents Hives CT contrast dye      Penicillins Hives    Sulfa Antibiotics Hives    Banana - Food Allergy Headache        ACTIVE PROBLEMS:  Patient Active Problem List   Diagnosis    Malignant [facility] scalp ([facility] )    Acquired hypothyroidism    Coronary arteriosclerosis    RBBB    Sarcoidosis    Seizures ([place] [address] )    Adrenal insufficiency due to cancer therapy ([place] [address] )    High risk medication use    Nausea    Vitamin B deficiency    Depression    Physical deconditioning          PAST MEDICAL HISTORY:   Past Medical History:   Diagnosis Date    Migraines     Prostate cancer ([place] [address] )     Sarcoidosis     Skin cancer         PAST SURGICAL HISTORY:  Past Surgical History:   Procedure Laterality Date    HERNIA REPAIR      PROSTATE SURGERY          SOCIAL HISTORY:  Social History     Socioeconomic History    Marital status: /Civil Union     Spouse name: None    Number of children: None    Years of education: None    Highest education level: None   Occupational History    None   Tobacco Use    Smoking status: Former Smoker     Quit date:      Years since quittin 7    Smokeless tobacco: Never Used   Vaping Use    Vaping Use: Never used   Substance and Sexual Activity    Alcohol use: Not Currently    Drug use: Never    Sexual activity: Not Currently   Other Topics Concern    None   Social History Narrative    None     Social Determinants of Health     Financial Resource Strain:     Difficulty of Paying Living Expenses:    Food Insecurity:     Worried About Running Out of Food in the Last Year:     Ran Out of Food in the Last Year:    Transportation Needs:     Lack of Transportation (Medical):      Lack of Transportation (Non-Medical):    Physical Activity:     Days of Exercise per Week:     Minutes of Exercise per Session:    Stress:     Feeling of Stress :    Social Connections:     Frequency of Communication with Friends and Family:     Frequency of Social Gatherings with Friends and Family:     Attends Tenriism Services:     Active Member of Clubs or Organizations:     Attends Club or Organization Meetings:     Marital Status:    Intimate Partner Violence:     Fear of Current or Ex-Partner:     Emotionally Abused:     Physically Abused:     Sexually Abused:         FAMILY HISTORY:  History reviewed  No pertinent family history  Objective    PHYSICAL EXAMINATION:   Blood pressure 126/80, pulse 84, resp  rate 14, height 5' 9" (1 753 m), weight 74 4 kg (164 lb)  ECOG Performance Status      Most Recent Value   ECOG Performance Status  1 - Restricted in physically strenuous activity but ambulatory and able to carry out work of a light or sedentary nature, e g , light house work, office work             Physical Exam  Constitutional:       General: He is not in acute distress  Appearance: Normal appearance  He is not toxic-appearing  HENT:      Head:      Comments: Fullness in his face/cheeks  Most likely due to steroids  Mouth/Throat:      Mouth: Mucous membranes are moist       Pharynx: Oropharynx is clear  Eyes:      General: No scleral icterus  Neck:      Comments: Fullness, due to swelling and steroid use  Cardiovascular:      Rate and Rhythm: Normal rate and regular rhythm  Pulses: Normal pulses  Heart sounds: No murmur heard  No friction rub  No gallop  Pulmonary:      Effort: Pulmonary effort is normal  No respiratory distress  Breath sounds: Normal breath sounds  No wheezing or rales  Abdominal:      General: There is no distension  Palpations: There is no mass  Tenderness: There is no abdominal tenderness  There is no rebound  Musculoskeletal:         General: No swelling or tenderness  Right lower leg: Edema (pitting in ankles) present  Left lower leg: Edema (pitting in ankles) present  Lymphadenopathy:      Head:      Right side of head: No submandibular, preauricular or posterior auricular adenopathy  Left side of head: No submandibular, preauricular or posterior auricular adenopathy  Cervical: No cervical adenopathy  Right cervical: No superficial or posterior cervical adenopathy  Left cervical: No superficial or posterior cervical adenopathy  Upper Body:      Right upper body: No supraclavicular or axillary adenopathy  Left upper body: No supraclavicular or axillary adenopathy  Lower Body: No right inguinal adenopathy  No left inguinal adenopathy  Skin:     Findings: No rash  Comments: Well healed surgical scar/skin graft site  Are of crusty scab in middle of anterior depression area  No evidence of recurrence at primary site  Neurological:      General: No focal deficit present  Mental Status: He is alert and oriented to person, place, and time  Psychiatric:         Mood and Affect: Mood normal          Behavior: Behavior normal          Thought Content: Thought content normal          Judgment: Judgment normal          I reviewed lab data in the chart      WBC   Date Value Ref Range Status   08/05/2021 12 53 (H) 4 31 - 10 16 Thousand/uL Final     White Blood Cell Count   Date Value Ref Range Status   09/16/2021 10 6 3 4 - 10 8 x10E3/uL Final   06/15/2021 10 8 3 4 - 10 8 x10E3/uL Final   06/02/2021 5 8 3 4 - 10 8 x10E3/uL Final     Hemoglobin   Date Value Ref Range Status   09/16/2021 14 1 13 0 - 17 7 g/dL Final   08/05/2021 14 9 12 0 - 17 0 g/dL Final   06/15/2021 15 2 13 0 - 17 7 g/dL Final   06/02/2021 15 4 13 0 - 17 7 g/dL Final     Platelet Count   Date Value Ref Range Status   09/16/2021 200 150 - 450 x10E3/uL Final   06/15/2021 267 150 - 450 x10E3/uL Final   06/02/2021 364 150 - 450 x10E3/uL Final     Platelets   Date Value Ref Range Status   08/05/2021 284 149 - 390 Thousands/uL Final     MCV   Date Value Ref Range Status   09/16/2021 95 79 - 97 fL Final   08/05/2021 93 82 - 98 fL Final   06/15/2021 86 79 - 97 fL Final   06/02/2021 86 79 - 97 fL Final Potassium   Date Value Ref Range Status   09/16/2021 4 0 3 5 - 5 2 mmol/L Final   08/05/2021 4 2 3 5 - 5 3 mmol/L Final   06/15/2021 4 9 3 5 - 5 2 mmol/L Final   06/02/2021 4 8 3 5 - 5 2 mmol/L Final     Chloride   Date Value Ref Range Status   09/16/2021 98 96 - 106 mmol/L Final   08/05/2021 98 (L) 100 - 108 mmol/L Final   06/15/2021 97 96 - 106 mmol/L Final   06/02/2021 91 (L) 96 - 106 mmol/L Final     CO2   Date Value Ref Range Status   09/16/2021 24 20 - 29 mmol/L Final   08/05/2021 25 21 - 32 mmol/L Final   06/15/2021 21 20 - 29 mmol/L Final   06/02/2021 19 (L) 20 - 29 mmol/L Final     BUN   Date Value Ref Range Status   09/16/2021 19 8 - 27 mg/dL Final   08/05/2021 20 5 - 25 mg/dL Final   06/15/2021 30 (H) 8 - 27 mg/dL Final   06/02/2021 21 8 - 27 mg/dL Final     Creatinine   Date Value Ref Range Status   09/16/2021 0 99 0 76 - 1 27 mg/dL Final   08/05/2021 1 00 0 60 - 1 30 mg/dL Final     Comment:     Standardized to IDMS reference method   06/15/2021 1 16 0 76 - 1 27 mg/dL Final   06/02/2021 1 22 0 76 - 1 27 mg/dL Final     Glucose, Random   Date Value Ref Range Status   09/16/2021 88 65 - 99 mg/dL Final   06/15/2021 127 (H) 65 - 99 mg/dL Final   06/02/2021 154 (H) 65 - 99 mg/dL Final     Glucose   Date Value Ref Range Status   08/05/2021 90 65 - 140 mg/dL Final     Comment:     If the patient is fasting, the ADA then defines impaired fasting glucose as > 100 mg/dL and diabetes as > or equal to 123 mg/dL  Specimen collection should occur prior to Sulfasalazine administration due to the potential for falsely depressed results  Specimen collection should occur prior to Sulfapyridine administration due to the potential for falsely elevated results  eGFR    Date Value Ref Range Status   09/16/2021 87 >59 mL/min/1 73 Final     Comment:     **[facility] currently reports eGFR in compliance with the current**    recommendations of the [employer]   Akhil Dye will    update reporting as new guidelines are published from the NKF-ASN    Task force  06/15/2021 72 >59 mL/min/1 73 Final     Comment:     **[facility] currently reports eGFR in compliance with the current**    recommendations of the [employer]  Langley Filter will    update reporting as new guidelines are published from the NKF-ASN    Task force  06/02/2021 68 >59 mL/min/1 73 Final     Comment:     **[facility] currently reports eGFR in compliance with the current**    recommendations of the [employer]  Langley Filter will    update reporting as new guidelines are published from the NKF-ASN    Task force  Calcium   Date Value Ref Range Status   08/05/2021 9 1 8 3 - 10 1 mg/dL Final     Albumin   Date Value Ref Range Status   09/16/2021 3 6 (L) 3 7 - 4 7 g/dL Final   08/05/2021 2 9 (L) 3 5 - 5 0 g/dL Final   06/15/2021 4 0 3 7 - 4 7 g/dL Final   06/02/2021 4 2 3 7 - 4 7 g/dL Final     TOTAL BILIRUBIN   Date Value Ref Range Status   09/16/2021 0 8 0 0 - 1 2 mg/dL Final   06/15/2021 0 5 0 0 - 1 2 mg/dL Final   06/02/2021 0 5 0 0 - 1 2 mg/dL Final     Total Bilirubin   Date Value Ref Range Status   08/05/2021 0 39 0 20 - 1 00 mg/dL Final     Comment:     Use of this assay is not recommended for patients undergoing treatment with eltrombopag due to the potential for falsely elevated results  Alkaline Phosphatase   Date Value Ref Range Status   08/05/2021 69 46 - 116 U/L Final     AST   Date Value Ref Range Status   09/16/2021 12 0 - 40 IU/L Final   08/05/2021 13 5 - 45 U/L Final     Comment:     Specimen collection should occur prior to Sulfasalazine administration due to the potential for falsely depressed results      06/15/2021 11 0 - 40 IU/L Final   06/02/2021 12 0 - 40 IU/L Final     ALT   Date Value Ref Range Status   09/16/2021 18 0 - 44 IU/L Final   08/05/2021 22 12 - 78 U/L Final     Comment:     Specimen collection should occur prior to Sulfasalazine and/or Sulfapyridine administration due to the potential for falsely depressed results  06/15/2021 17 0 - 44 IU/L Final   06/02/2021 10 0 - 44 IU/L Final      LD   Date Value Ref Range Status   08/05/2021 220 81 - 234 U/L Final     LDH   Date Value Ref Range Status   09/16/2021 257 (H) 121 - 224 IU/L Final   06/15/2021 129 121 - 224 IU/L Final   06/02/2021 119 (L) 121 - 224 IU/L Final     TSH   Date Value Ref Range Status   09/16/2021 1 620 0 450 - 4 500 uIU/mL Final   06/15/2021 0 233 (L) 0 450 - 4 500 uIU/mL Final   06/02/2021 0 272 (L) 0 450 - 4 500 uIU/mL Final     No results found for: [id number]   Free T4   Date Value Ref Range Status   08/05/2021 1 45 0 76 - 1 46 ng/dL Final     Comment:     Specimen collection should occur prior to Sulfasalazine administration due to the potential for falsely elevated results  Free t4   Date Value Ref Range Status   09/16/2021 1 48 0 82 - 1 77 ng/dL Final         RECENT IMAGING:  Due prior to his next visit        Assessment/Plan  Mr Alexei Maldonado is a 66 yr male with stage IIIC melanoma on treatment with adjuvant pembrolizumab on hold for adrenal insufficiency and ongoing fatigue and side effects here for follow-up  Malignant melanoma of scalp ([facility] )  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 and inability to taper his dose  He is currently on 30 mg of prednisone daily and we will drop him to 20 mg prednisone daily-10 mg in the morning and 10 mg in the evening  He will call us in 2 weeks to let us know how he is feeling  Given all his symptoms and still uncertain etiology to his side effects, immune mediated side effects versus medication itself, we have decided to hold any additional pembrolizumab treatments    He was originally scheduled and treatment December 2021     Labs reviewed and are within normal limits, are being addressed, or otherwise not clinically significant  He is due for scans in 2 months  Provided him with scripts for a PET scan and he will return to see us in clinic following his imaging  Acquired hypothyroidism  Continuing to monitor TSH, T3, T4  He is being followed by endocrinology as well  Adrenal insufficiency due to cancer therapy [facility])  Followed by endocrinology  He is on prednisone which we are attempting to taper  He is also on fludrocortisone  High risk medication use  He was on treatment with immunotherapy and we will continue to monitor for side effects and lab abnormalities  We will monitor labs to ensure safety now that he has completed treatment  He knows to watch for signs and symptoms for immune mediated side effects  Nausea  improved    Vitamin B deficiency  Received weekly vitamin B12 injections and now on monthly injections  He has not noticed much improvement in his fatigue  We will check vitamin B12 levels with his next set of labs  Physical deconditioning  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  Have recommended physical therapy to help increase his muscle mass and tone  Hope that this will also improve as we taper his steroids  Depression  Concerned that some of his fatigue is related to depression  He has had significant immune mediated side effects for some time now and has led to his deconditioning and 2 hospitalizations  Will add SSRI in attempt for improvement  Prescribed Lexapro 10 mg daily  Will assess in 2 weeks, though explained that it could take 4-6 weeks to reach maximal effect  Can increase dose if needed  He will return in 2 months but knows to call with any questions or concerns prior to his next visit      Sugey Garcia MD, PhD

## 2021-09-21 PROBLEM — R53.81 PHYSICAL DECONDITIONING: Status: ACTIVE | Noted: 2021-09-21

## 2021-09-21 PROBLEM — F32.A DEPRESSION: Status: ACTIVE | Noted: 2021-09-21

## 2021-09-21 LAB
ALBUMIN SERPL-MCNC: 3.6 G/DL (ref 3.7–4.7)
ALBUMIN/GLOB SERPL: 1.6 {RATIO} (ref 1.2–2.2)
ALP SERPL-CCNC: 63 IU/L (ref 44–121)
ALT SERPL-CCNC: 18 IU/L (ref 0–44)
AST SERPL-CCNC: 12 IU/L (ref 0–40)
BASOPHILS # BLD AUTO: 0 X10E3/UL (ref 0–0.2)
BASOPHILS NFR BLD AUTO: 0 %
BILIRUB SERPL-MCNC: 0.8 MG/DL (ref 0–1.2)
BUN SERPL-MCNC: 19 MG/DL (ref 8–27)
BUN/CREAT SERPL: 19 (ref 10–24)
CALCIUM SERPL-MCNC: 9.1 MG/DL (ref 8.6–10.2)
CHLORIDE SERPL-SCNC: 98 MMOL/L (ref 96–106)
CO2 SERPL-SCNC: 24 MMOL/L (ref 20–29)
CREAT SERPL-MCNC: 0.99 MG/DL (ref 0.76–1.27)
EOSINOPHIL # BLD AUTO: 0 X10E3/UL (ref 0–0.4)
EOSINOPHIL NFR BLD AUTO: 0 %
ERYTHROCYTE [DISTWIDTH] IN BLOOD BY AUTOMATED COUNT: 15.1 % (ref 11.6–15.4)
GLOBULIN SER-MCNC: 2.3 G/DL (ref 1.5–4.5)
GLUCOSE SERPL-MCNC: 88 MG/DL (ref 65–99)
HCT VFR BLD AUTO: 40.9 % (ref 37.5–51)
HGB BLD-MCNC: 14.1 G/DL (ref 13–17.7)
LDH SERPL-CCNC: 257 IU/L (ref 121–224)
LYMPHOCYTES # BLD AUTO: 0.4 X10E3/UL (ref 0.7–3.1)
LYMPHOCYTES NFR BLD AUTO: 4 %
MCH RBC QN AUTO: 32.6 PG (ref 26.6–33)
MCHC RBC AUTO-ENTMCNC: 34.5 G/DL (ref 31.5–35.7)
MCV RBC AUTO: 95 FL (ref 79–97)
MONOCYTES # BLD AUTO: 0.8 X10E3/UL (ref 0.1–0.9)
MONOCYTES NFR BLD AUTO: 8 %
MORPHOLOGY BLD-IMP: ABNORMAL
NEUTROPHILS # BLD AUTO: 9.3 X10E3/UL (ref 1.4–7)
NEUTROPHILS NFR BLD AUTO: 88 %
PLATELET # BLD AUTO: 200 X10E3/UL (ref 150–450)
POTASSIUM SERPL-SCNC: 4 MMOL/L (ref 3.5–5.2)
PROT SERPL-MCNC: 5.9 G/DL (ref 6–8.5)
RBC # BLD AUTO: 4.32 X10E6/UL (ref 4.14–5.8)
SL AMB EGFR AFRICAN AMERICAN: 87 ML/MIN/1.73
SL AMB EGFR NON AFRICAN AMERICAN: 75 ML/MIN/1.73
SODIUM SERPL-SCNC: 139 MMOL/L (ref 134–144)
TESTOST FREE MFR SERPL: 2 %
TESTOST FREE SERPL-MCNC: 32 PG/ML
TESTOST SERPL-MCNC: 145 NG/DL (ref 264–916)
TESTOST SERPL-MCNC: 158 NG/DL
TESTOSTERONE.FREE+WB MFR SERPL: 35.1 %
TESTOSTERONE.FREE+WB SERPL-MCNC: 55 NG/DL
VIT B12 SERPL-MCNC: 916 PG/ML (ref 232–1245)
WBC # BLD AUTO: 10.6 X10E3/UL (ref 3.4–10.8)

## 2021-09-21 NOTE — ASSESSMENT & PLAN NOTE
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

## 2021-09-21 NOTE — ASSESSMENT & PLAN NOTE
Received weekly vitamin B12 injections and now on monthly injections  He has not noticed much improvement in his fatigue  We will check vitamin B12 levels with his next set of labs

## 2021-09-21 NOTE — ASSESSMENT & PLAN NOTE
Followed by endocrinology  He is on prednisone which we are attempting to taper  He is also on fludrocortisone

## 2021-09-21 NOTE — ASSESSMENT & PLAN NOTE
He was on treatment with immunotherapy and we will continue to monitor for side effects and lab abnormalities  We will monitor labs to ensure safety now that he has completed treatment  He knows to watch for signs and symptoms for immune mediated side effects

## 2021-09-21 NOTE — ASSESSMENT & PLAN NOTE
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 and inability to taper his dose  He is currently on 30 mg of prednisone daily and we will drop him to 20 mg prednisone daily-10 mg in the morning and 10 mg in the evening  He will call us in 2 weeks to let us know how he is feeling  Given all his symptoms and still uncertain etiology to his side effects, immune mediated side effects versus medication itself, we have decided to hold any additional pembrolizumab treatments  He was originally scheduled and treatment December 2021  Labs reviewed and are within normal limits, are being addressed, or otherwise not clinically significant  He is due for scans in 2 months  Provided him with scripts for a PET scan and he will return to see us in clinic following his imaging

## 2021-09-21 NOTE — PROGRESS NOTES
[facility]ATOLOGY [facility] [place]  [address] [employer] [address]  [phone]     Date of Visit: 9/20/2021  Name: Glen Bates   YOB: 1950        Subjective    VISIT DIAGNOSIS:  Diagnoses and all orders for this visit:    Malignant melanoma of scalp ([facility] )  -     NM pet ct tumor imaging whole body; Future    Physical deconditioning  -     Ambulatory referral to Physical Therapy; Future    Adrenal insufficiency due to cancer therapy [facility])  -     Ambulatory referral to Physical Therapy; Future    Depression, unspecified depression type  -     escitalopram (LEXAPRO) 10 mg tablet; Take 1 tablet (10 mg total) by mouth daily    Acquired hypothyroidism    Nausea    Other orders  -     predniSONE 10 mg tablet  -     predniSONE 20 mg tablet        Oncology History   Malignant melanoma of scalp ([facility] )   5/15/2020 Initial Diagnosis    Malignant melanoma of scalp ([facility] )     6/15/2020 -  Cancer Staged    Staging form: Melanoma of the Skin, AJCC 8th Edition  - Clinical stage from 6/15/2020: Stage IIB (cT3b, cN0, cM0) - Signed by Neeta Savage MD on 3/22/2021       10/26/2020 -  Cancer Staged    Staging form: Melanoma of the Skin, AJCC 8th Edition  - Pathologic stage from 10/26/2020: Stage IIIC (pT3b, pN1c, cM0) - Signed by Neeta Savage MD on 3/22/2021       11/2/2020 - 11/2/2020 Radiation    Performed at location closer to home    Radiation to scalp         12/28/2020 -  Chemotherapy    pembrolizumab (KEYTRUDA) IVPB, 400 mg (200 % of original dose 200 mg), Intravenous, Once, 5 of 9 cycles  Dose modification: 400 mg (original dose 200 mg, Cycle 1, Reason: Other (Must fill in a comment), Comment: new 6 week dosing)  Administration: 400 mg (5/6/2021)        Cancer Staging  Malignant melanoma of scalp ([facility] )  Staging form: Melanoma of the Skin, AJCC 8th Edition  - Clinical stage from 6/15/2020: Stage IIB (cT3b, cN0, cM0) - Signed by Neeta Savage MD on 3/22/2021  - Pathologic stage from 10/26/2020: Stage IIIC (pT3b, pN1c, cM0) - Signed by Zi Parikh MD on 3/22/2021     Treatment Details   Treatment goal Curative   Plan Name OP Pembrolizumab Q 42 Days   Status Active   Start Date 12/28/2020   End Date 1/13/2022 (Planned)   Provider Zi Parikh MD   Chemotherapy pembrolizumab [facility]) IVPB, 400 mg (200 % of original dose 200 mg), Intravenous, Once, 5 of 9 cycles  Dose modification: 400 mg (original dose 200 mg, Cycle 1, Reason: Other (Must fill in a comment), Comment: new 6 week dosing)  Administration: 400 mg (5/6/2021)       Treatment Details   Treatment goal [No plan goal]   Plan Name VITAMIN B12 INJECTION (CYANOCOBALAMIN)   Status Active   Start Date 8/12/2021   End Date Until discontinued   Provider Zi Parikh MD   Chemotherapy cyanocobalamin, 1,000 mcg, Intramuscular, Once, 1 of 1 cycle  Administration: 1,000 mcg (8/12/2021)          HISTORY OF PRESENT ILLNESS: Quentin Cueva is a 66 y o  male  who has stage IIIC melanoma on adjuvant treatment with pembrolizumab which has been on hold due to profound fatigue and associated symptoms here for follow-up  He is doing okay since his last visit  His energy does continue to improve, though he still feels fatigued but not to the extent that it was previously, resulting in 2 hospitalizations  He also describes lack of desire or initiative to get up and do things  States that he thinks of " I need to do X, but then sits there and does not do X "  He is following with endocrinology  He did have labs demonstrating low testosterone levels  There was discussion with supplementation, though he is extremely hesitant given that he has had prostate cancer in the past   He is currently on 30 mg of prednisone-20 mg in the morning and 10 mg at night  He also feels like he has a metallic taste in his mouth and sometimes food does not taste good to him    He is eating and he does have an appetite and he has put back on some of the weight that he has loss  He does describe having muscle weakness in his legs despite trying to walk further and be more active  He also still has his tremors  He isn't able to stay asleep at night as well  He states he gets up to urinate multiple times in the evening, and then is unable to fall back asleep  He is then tired during the day and needs to take a nap  He denies any new, changing, or concerning lesions  Continues to follow with his doctor colleges  He denies any headaches, vision, rash, itching, or diarrhea  REVIEW OF SYSTEMS:  Review of Systems   Constitutional: Positive for appetite change, fatigue and unexpected weight change (improving)  Negative for fever  HENT:   Negative for lump/mass  Eyes: Negative for icterus  Respiratory: Negative for cough, shortness of breath and wheezing  Cardiovascular: Negative for leg swelling  Gastrointestinal: Negative for abdominal pain, constipation, diarrhea, nausea and vomiting  Genitourinary: Positive for nocturia  Negative for difficulty urinating and hematuria  Musculoskeletal: Negative for arthralgias, gait problem and myalgias  Skin: Negative for itching and rash  No new, changing, or concerning lesions  Neurological: Positive for extremity weakness  Negative for gait problem, headaches, light-headedness and numbness  Hematological: Negative for adenopathy  Psychiatric/Behavioral: Positive for depression and sleep disturbance          MEDICATIONS:    Current Outpatient Medications:     Aspirin Buf,CaCarb-MgCarb-MgO, 81 MG TABS, Take 81 mg by mouth, Disp: , Rfl:     benzonatate (TESSALON PERLES) 100 mg capsule, Take 100 mg by mouth 3 (three) times a day as needed for cough, Disp: , Rfl:     betamethasone dipropionate (DIPROSONE) 0 05 % cream, Apply topically 2 (two) times a day, Disp: , Rfl:     cyanocobalamin (VITAMIN B-12) 1000 MCG tablet, Take 1 tablet (1,000 mcg total) by mouth daily, Disp: 30 tablet, Rfl: 3    fludrocortisone (FLORINEF) 0 1 mg tablet, Take 0 1 mg by mouth every morning, Disp: , Rfl:     gemfibrozil (LOPID) 600 mg tablet, Take 600 mg by mouth, Disp: , Rfl:     lactulose (CHRONULAC) 10 g/15 mL solution, DISSOLVE 30 MLS IN JUICE/WATER AND DRINK TWICE DAILY, Disp: , Rfl:     levothyroxine 125 mcg tablet, Take 1 tablet (125 mcg total) by mouth daily, Disp: 30 tablet, Rfl: 5    ondansetron (ZOFRAN) 4 mg tablet, Take 1 tablet (4 mg total) by mouth every 8 (eight) hours as needed for nausea or vomiting, Disp: 30 tablet, Rfl: 5    pantoprazole (PROTONIX) 40 mg tablet, Take 40 mg by mouth daily, Disp: , Rfl:     predniSONE 10 mg tablet, , Disp: , Rfl:     predniSONE 20 mg tablet, , Disp: , Rfl:     prochlorperazine (COMPAZINE) 10 mg tablet, Take 1 tablet (10 mg total) by mouth every 6 (six) hours as needed for nausea or vomiting, Disp: 30 tablet, Rfl: 2    simvastatin (ZOCOR) 40 mg tablet, Take 40 mg by mouth, Disp: , Rfl:     SUMAtriptan (IMITREX) 50 mg tablet, Take 50 mg by mouth once as needed , Disp: , Rfl:     escitalopram (LEXAPRO) 10 mg tablet, Take 1 tablet (10 mg total) by mouth daily, Disp: 30 tablet, Rfl: 1     ALLERGIES:  Allergies   Allergen Reactions    Chocolate - Food Allergy Other (See Comments)    Iodinated Diagnostic Agents Hives     CT contrast dye      Penicillins Hives    Sulfa Antibiotics Hives    Banana - Food Allergy Headache        ACTIVE PROBLEMS:  Patient Active Problem List   Diagnosis    Malignant melanoma of scalp ([facility])    Acquired hypothyroidism    Coronary arteriosclerosis    RBBB    Sarcoidosis    Seizures ([facility])    Adrenal insufficiency due to cancer therapy ([facility])    High risk medication use    Nausea    Vitamin B deficiency    Depression    Physical deconditioning          PAST MEDICAL HISTORY:   Past Medical History:   Diagnosis Date    Migraines     Prostate cancer ([facility])     Sarcoidosis     Skin cancer         PAST SURGICAL HISTORY:  Past Surgical History:   Procedure Laterality Date    HERNIA REPAIR      PROSTATE SURGERY          SOCIAL HISTORY:  Social History     Socioeconomic History    Marital status: /Civil Union     Spouse name: None    Number of children: None    Years of education: None    Highest education level: None   Occupational History    None   Tobacco Use    Smoking status: Former Smoker     Quit date:      Years since quittin 7    Smokeless tobacco: Never Used   Vaping Use    Vaping Use: Never used   Substance and Sexual Activity    Alcohol use: Not Currently    Drug use: Never    Sexual activity: Not Currently   Other Topics Concern    None   Social History Narrative    None     Social Determinants of Health     Financial Resource Strain:     Difficulty of Paying Living Expenses:    Food Insecurity:     Worried About Running Out of Food in the Last Year:     Ran Out of Food in the Last Year:    Transportation Needs:     Lack of Transportation (Medical):  Lack of Transportation (Non-Medical):    Physical Activity:     Days of Exercise per Week:     Minutes of Exercise per Session:    Stress:     Feeling of Stress :    Social Connections:     Frequency of Communication with Friends and Family:     Frequency of Social Gatherings with Friends and Family:     Attends Rastafarian Services:     Active Member of Clubs or Organizations:     Attends Club or Organization Meetings:     Marital Status:    Intimate Partner Violence:     Fear of Current or Ex-Partner:     Emotionally Abused:     Physically Abused:     Sexually Abused:         FAMILY HISTORY:  History reviewed  No pertinent family history  Objective    PHYSICAL EXAMINATION:   Blood pressure 126/80, pulse 84, resp  rate 14, height 5' 9" (1 753 m), weight 74 4 kg (164 lb)       ECOG Performance Status      Most Recent Value   ECOG Performance Status  1 - Restricted in physically strenuous activity but ambulatory and able to carry out work of a light or sedentary nature, e g , light house work, office work             Physical Exam  Constitutional:       General: He is not in acute distress  Appearance: Normal appearance  He is not toxic-appearing  HENT:      Head:      Comments: Fullness in his face/cheeks  Most likely due to steroids  Mouth/Throat:      Mouth: Mucous membranes are moist       Pharynx: Oropharynx is clear  Eyes:      General: No scleral icterus  Neck:      Comments: Fullness, due to swelling and steroid use  Cardiovascular:      Rate and Rhythm: Normal rate and regular rhythm  Pulses: Normal pulses  Heart sounds: No murmur heard  No friction rub  No gallop  Pulmonary:      Effort: Pulmonary effort is normal  No respiratory distress  Breath sounds: Normal breath sounds  No wheezing or rales  Abdominal:      General: There is no distension  Palpations: There is no mass  Tenderness: There is no abdominal tenderness  There is no rebound  Musculoskeletal:         General: No swelling or tenderness  Right lower leg: Edema (pitting in ankles) present  Left lower leg: Edema (pitting in ankles) present  Lymphadenopathy:      Head:      Right side of head: No submandibular, preauricular or posterior auricular adenopathy  Left side of head: No submandibular, preauricular or posterior auricular adenopathy  Cervical: No cervical adenopathy  Right cervical: No superficial or posterior cervical adenopathy  Left cervical: No superficial or posterior cervical adenopathy  Upper Body:      Right upper body: No supraclavicular or axillary adenopathy  Left upper body: No supraclavicular or axillary adenopathy  Lower Body: No right inguinal adenopathy  No left inguinal adenopathy  Skin:     Findings: No rash  Comments: Well healed surgical scar/skin graft site    Are of crusty scab in middle of anterior depression area  No evidence of recurrence at primary site  Neurological:      General: No focal deficit present  Mental Status: He is alert and oriented to person, place, and time  Psychiatric:         Mood and Affect: Mood normal          Behavior: Behavior normal          Thought Content: Thought content normal          Judgment: Judgment normal          I reviewed lab data in the chart      WBC   Date Value Ref Range Status   08/05/2021 12 53 (H) 4 31 - 10 16 Thousand/uL Final     White Blood Cell Count   Date Value Ref Range Status   09/16/2021 10 6 3 4 - 10 8 x10E3/uL Final   06/15/2021 10 8 3 4 - 10 8 x10E3/uL Final   06/02/2021 5 8 3 4 - 10 8 x10E3/uL Final     Hemoglobin   Date Value Ref Range Status   09/16/2021 14 1 13 0 - 17 7 g/dL Final   08/05/2021 14 9 12 0 - 17 0 g/dL Final   06/15/2021 15 2 13 0 - 17 7 g/dL Final   06/02/2021 15 4 13 0 - 17 7 g/dL Final     Platelet Count   Date Value Ref Range Status   09/16/2021 200 150 - 450 x10E3/uL Final   06/15/2021 267 150 - 450 x10E3/uL Final   06/02/2021 364 150 - 450 x10E3/uL Final     Platelets   Date Value Ref Range Status   08/05/2021 284 149 - 390 Thousands/uL Final     MCV   Date Value Ref Range Status   09/16/2021 95 79 - 97 fL Final   08/05/2021 93 82 - 98 fL Final   06/15/2021 86 79 - 97 fL Final   06/02/2021 86 79 - 97 fL Final      Potassium   Date Value Ref Range Status   09/16/2021 4 0 3 5 - 5 2 mmol/L Final   08/05/2021 4 2 3 5 - 5 3 mmol/L Final   06/15/2021 4 9 3 5 - 5 2 mmol/L Final   06/02/2021 4 8 3 5 - 5 2 mmol/L Final     Chloride   Date Value Ref Range Status   09/16/2021 98 96 - 106 mmol/L Final   08/05/2021 98 (L) 100 - 108 mmol/L Final   06/15/2021 97 96 - 106 mmol/L Final   06/02/2021 91 (L) 96 - 106 mmol/L Final     CO2   Date Value Ref Range Status   09/16/2021 24 20 - 29 mmol/L Final   08/05/2021 25 21 - 32 mmol/L Final   06/15/2021 21 20 - 29 mmol/L Final   06/02/2021 19 (L) 20 - 29 mmol/L Final     BUN   Date Value Ref Range Status   09/16/2021 19 8 - 27 mg/dL Final   08/05/2021 20 5 - 25 mg/dL Final   06/15/2021 30 (H) 8 - 27 mg/dL Final   06/02/2021 21 8 - 27 mg/dL Final     Creatinine   Date Value Ref Range Status   09/16/2021 0 99 0 76 - 1 27 mg/dL Final   08/05/2021 1 00 0 60 - 1 30 mg/dL Final     Comment:     Standardized to IDMS reference method   06/15/2021 1 16 0 76 - 1 27 mg/dL Final   06/02/2021 1 22 0 76 - 1 27 mg/dL Final     Glucose, Random   Date Value Ref Range Status   09/16/2021 88 65 - 99 mg/dL Final   06/15/2021 127 (H) 65 - 99 mg/dL Final   06/02/2021 154 (H) 65 - 99 mg/dL Final     Glucose   Date Value Ref Range Status   08/05/2021 90 65 - 140 mg/dL Final     Comment:     If the patient is fasting, the ADA then defines impaired fasting glucose as > 100 mg/dL and diabetes as > or equal to 123 mg/dL  Specimen collection should occur prior to Sulfasalazine administration due to the potential for falsely depressed results  Specimen collection should occur prior to Sulfapyridine administration due to the potential for falsely elevated results  eGFR    Date Value Ref Range Status   09/16/2021 87 >59 mL/min/1 73 Final     Comment:     **[facility] currently reports eGFR in compliance with the current**    recommendations of the [employer]  Saba Brass will    update reporting as new guidelines are published from the NKF-ASN    Task force  06/15/2021 72 >59 mL/min/1 73 Final     Comment:     **[facility] currently reports eGFR in compliance with the current**    recommendations of the [employer]  Saba Brass will    update reporting as new guidelines are published from the NKF-ASN    Task force  06/02/2021 68 >59 mL/min/1 73 Final     Comment:     **[facility] currently reports eGFR in compliance with the current**    recommendations of the [employer]   Saba Brass will    update reporting as new guidelines are published from the NKF-ASN    Task force  Calcium   Date Value Ref Range Status   08/05/2021 9 1 8 3 - 10 1 mg/dL Final     Albumin   Date Value Ref Range Status   09/16/2021 3 6 (L) 3 7 - 4 7 g/dL Final   08/05/2021 2 9 (L) 3 5 - 5 0 g/dL Final   06/15/2021 4 0 3 7 - 4 7 g/dL Final   06/02/2021 4 2 3 7 - 4 7 g/dL Final     TOTAL BILIRUBIN   Date Value Ref Range Status   09/16/2021 0 8 0 0 - 1 2 mg/dL Final   06/15/2021 0 5 0 0 - 1 2 mg/dL Final   06/02/2021 0 5 0 0 - 1 2 mg/dL Final     Total Bilirubin   Date Value Ref Range Status   08/05/2021 0 39 0 20 - 1 00 mg/dL Final     Comment:     Use of this assay is not recommended for patients undergoing treatment with eltrombopag due to the potential for falsely elevated results  Alkaline Phosphatase   Date Value Ref Range Status   08/05/2021 69 46 - 116 U/L Final     AST   Date Value Ref Range Status   09/16/2021 12 0 - 40 IU/L Final   08/05/2021 13 5 - 45 U/L Final     Comment:     Specimen collection should occur prior to Sulfasalazine administration due to the potential for falsely depressed results  06/15/2021 11 0 - 40 IU/L Final   06/02/2021 12 0 - 40 IU/L Final     ALT   Date Value Ref Range Status   09/16/2021 18 0 - 44 IU/L Final   08/05/2021 22 12 - 78 U/L Final     Comment:     Specimen collection should occur prior to Sulfasalazine and/or Sulfapyridine administration due to the potential for falsely depressed results      06/15/2021 17 0 - 44 IU/L Final   06/02/2021 10 0 - 44 IU/L Final      LD   Date Value Ref Range Status   08/05/2021 220 81 - 234 U/L Final     LDH   Date Value Ref Range Status   09/16/2021 257 (H) 121 - 224 IU/L Final   06/15/2021 129 121 - 224 IU/L Final   06/02/2021 119 (L) 121 - 224 IU/L Final     TSH   Date Value Ref Range Status   09/16/2021 1 620 0 450 - 4 500 uIU/mL Final   06/15/2021 0 233 (L) 0 450 - 4 500 uIU/mL Final   06/02/2021 0 272 (L) 0 450 - 4 500 uIU/mL Final     No results found for: [id number]   Free T4   Date Value Ref Range Status 08/05/2021 1 45 0 76 - 1 46 ng/dL Final     Comment:     Specimen collection should occur prior to Sulfasalazine administration due to the potential for falsely elevated results  Free t4   Date Value Ref Range Status   09/16/2021 1 48 0 82 - 1 77 ng/dL Final         RECENT IMAGING:  Due prior to his next visit        Assessment/Plan  Mr Ai Velazquez is a 66 yr male with stage IIIC melanoma on treatment with adjuvant pembrolizumab on hold for adrenal insufficiency and ongoing fatigue and side effects here for follow-up  Malignant melanoma of scalp ([facility] )  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 and inability to taper his dose  He is currently on 30 mg of prednisone daily and we will drop him to 20 mg prednisone daily-10 mg in the morning and 10 mg in the evening  He will call us in 2 weeks to let us know how he is feeling  Given all his symptoms and still uncertain etiology to his side effects, immune mediated side effects versus medication itself, we have decided to hold any additional pembrolizumab treatments  He was originally scheduled and treatment December 2021  Labs reviewed and are within normal limits, are being addressed, or otherwise not clinically significant  He is due for scans in 2 months  Provided him with scripts for a PET scan and he will return to see us in clinic following his imaging  Acquired hypothyroidism  Continuing to monitor TSH, T3, T4  He is being followed by endocrinology as well  Adrenal insufficiency due to cancer therapy [facility])  Followed by endocrinology  He is on prednisone which we are attempting to taper  He is also on fludrocortisone      High risk medication use  He was on treatment with immunotherapy and we will continue to monitor for side effects and lab abnormalities  We will monitor labs to ensure safety now that he has completed treatment  He knows to watch for signs and symptoms for immune mediated side effects  Nausea  improved    Vitamin B deficiency  Received weekly vitamin B12 injections and now on monthly injections  He has not noticed much improvement in his fatigue  We will check vitamin B12 levels with his next set of labs  Physical deconditioning  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  Have recommended physical therapy to help increase his muscle mass and tone  Hope that this will also improve as we taper his steroids  Depression  Concerned that some of his fatigue is related to depression  He has had significant immune mediated side effects for some time now and has led to his deconditioning and 2 hospitalizations  Will add SSRI in attempt for improvement  Prescribed Lexapro 10 mg daily  Will assess in 2 weeks, though explained that it could take 4-6 weeks to reach maximal effect  Can increase dose if needed  He will return in 2 months but knows to call with any questions or concerns prior to his next visit      Ruel Collisn MD, PhD

## 2021-09-21 NOTE — ASSESSMENT & PLAN NOTE
Concerned that some of his fatigue is related to depression  He has had significant immune mediated side effects for some time now and has led to his deconditioning and 2 hospitalizations  Will add SSRI in attempt for improvement  Prescribed Lexapro 10 mg daily  Will assess in 2 weeks, though explained that it could take 4-6 weeks to reach maximal effect  Can increase dose if needed

## 2021-10-01 DIAGNOSIS — C43.4 MALIGNANT MELANOMA OF SCALP (HCC): ICD-10-CM

## 2021-10-01 DIAGNOSIS — R11.0 NAUSEA: ICD-10-CM

## 2021-10-01 DIAGNOSIS — E03.9 ACQUIRED HYPOTHYROIDISM: Primary | ICD-10-CM

## 2021-10-01 RX ORDER — LEVOTHYROXINE SODIUM 0.1 MG/1
100 TABLET ORAL DAILY
Qty: 30 TABLET | Refills: 5 | Status: SHIPPED | OUTPATIENT
Start: 2021-10-01

## 2021-10-07 ENCOUNTER — TELEPHONE (OUTPATIENT)
Dept: HEMATOLOGY ONCOLOGY | Facility: CLINIC | Age: 71
End: 2021-10-07

## 2021-11-17 ENCOUNTER — TELEPHONE (OUTPATIENT)
Dept: HEMATOLOGY ONCOLOGY | Facility: CLINIC | Age: 71
End: 2021-11-17

## 2023-11-08 NOTE — TELEPHONE ENCOUNTER
How Severe Are Your Spot(S)?: mild Spoke with patient  He will forward the images to my email: [email or web address] What Is The Reason For Today's Visit?: Full Body Skin Examination What Is The Reason For Today's Visit? (Being Monitored For X): the development of a new lesion

## 2024-07-11 NOTE — LETTER
June 18, 2021     Gail Navarro MD  [address] [facility]  [address] [facility]    Patient: Pia Wade   YOB: 1950   Date of Visit: 6/17/2021       Dear Dr Riki Rainey: Thank you for referring Anna Kumar to me for evaluation  Below are my notes for this consultation  If you have questions, please do not hesitate to call me  I look forward to following your patient along with you  Sincerely,        Romana Aldridge MD        CC: MD Vincenzo Mclaughlin MD Valma Craver, MD  6/18/2021  4:52 AM  Sign when Signing Visit  [address] [place] [address] Evelia Wise [address]  [phone]     Date of Visit: 6/17/2021  Name: Pia Case   YOB: 1950     Subjective    Subjective    VISIT DIAGNOSIS:  Diagnoses and all orders for this visit:    Malignant melanoma of scalp [facility])    Acquired hypothyroidism    Adrenal insufficiency due to cancer therapy [facility])    Sarcoidosis        Oncology History   Malignant melanoma of scalp ([facility] [address] )   5/15/2020 Initial Diagnosis    Malignant melanoma of scalp ([facility] [address] )     6/15/2020 -  Cancer Staged    Staging form: Melanoma of the Skin, AJCC 8th Edition  - Clinical stage from 6/15/2020: Stage IIB (cT3b, cN0, cM0) - Signed by Romana Aldridge MD on 3/22/2021       10/26/2020 -  Cancer Staged    Staging form: Melanoma of the Skin, AJCC 8th Edition  - Pathologic stage from 10/26/2020: Stage IIIC (pT3b, pN1c, cM0) - Signed by Romana Aldridge MD on 3/22/2021       11/2/2020 - 11/2/2020 Radiation    Performed at location closer to home    Radiation to scalp         12/28/2020 -  Chemotherapy    pembrolizumab (KEYTRUDA) 400 mg in sodium chloride 0 9 % 50 mL IVPB, 400 mg (200 % of original dose 200 mg), Intravenous, Once, 4 of 9 cycles  Dose modification: 400 mg (original dose 200 mg, Cycle 1, Reason: Other (Must fill in a comment), Comment: new 6 week dosing)  Administration: 400 mg (5/6/2021)        Cancer Staging  Malignant melanoma of scalp ([facility] [address] )  Staging form: Melanoma of the Skin, AJCC 8th Edition  - Clinical stage from 6/15/2020: Stage IIB (cT3b, cN0, cM0) - Signed by Rancho Mart MD on 3/22/2021  - Pathologic stage from 10/26/2020: Stage IIIC (pT3b, pN1c, cM0) - Signed by Rancho Mart MD on 3/22/2021     Treatment Details   Treatment goal Curative   Plan Name OP Pembrolizumab Q 42 Days   Status Active   Start Date 12/28/2020   End Date 12/2/2021 (Planned)   Provider Rancho Mart MD   Chemotherapy pembrolizumab [facility]) IVPB, 400 mg (200 % of original dose 200 mg), Intravenous, Once, 4 of 9 cycles  Dose modification: 400 mg (original dose 200 mg, Cycle 1, Reason: Other (Must fill in a comment), Comment: new 6 week dosing)  Administration: 400 mg (5/6/2021)          HISTORY OF PRESENT ILLNESS: Zi Reynolds is a 66 y o  male  who Stage IIIC melanoma on treatment with pembrolizumab  here for follow up and treamt  Since his last visit, Mr Lorenzo Carter has been diagnosed with adrenal insufficiency  He sent a message in through [address] on 05/30/2021 and he was having increased fatigue and really feeling lousy  He was having worsening nausea, he did not have any energy to do anything he wanted to do  He had a complete loss of appetite  Along with his exhaustion, he was having shortness of breath while he was walking up steps and any type of exertion as well  We got him a stat CT chest to rule out pneumonitis, echo to rule out heart failure, and a m  labs to look for cortisol and ACTH  We started him on high-dose steroids in the event that this was pneumonitis while waiting for results to come back  CT chest was negative, echo was normal, but a m  cortisol and ACTH were low confirm the diagnosis of adrenal insufficiency  We tapered his steroids to 1 mg/kg and have been slowly tapering down    He is currently on 60 mg of prednisone a day   On this dose he states his nausea has resolved, his cough has resolved, and he overall feels well  He is not having any fatigue or exhaustion  His nausea is still minimal, only intermittent at this time, he actually needed to take an antinausea medication this morning with his bout of nausea, but this is the 1st he has experienced over the past couple weeks  His appetite is still minimal, but improved over what it was prior to starting the steroids  He also describes having a tremor that developed over the past couple months and has slowly been getting worse  It is affecting his writing and being able to do fine motor skills  He denies any numbness or tingling, or neuropathy  The tremor seemed to somewhat improve on high-dose steroids, 2 mg/kg, but has returned now that we have lowered the dose  We did get a brain MRI to evaluate the potential etiology of the symptoms  Brain MRI demonstrated FLAIR hyperintensities in the periventricular and subcortical white matter, as well as in the leana  The differential diagnosis includes small vessel ischemic disease, and more concerning diagnoses are central pontine myelinolysis, a demyelinating process, an inflammatory process including infectious etiology, as well as metabolic process  He denies any immediate issues at the time of his visit today  He denies any new, changing, or concerning skin lesions  He denies any new lymphadenopathy  REVIEW OF SYSTEMS:  Review of Systems   Constitutional: Positive for appetite change (not to oimproved), fatigue (improving) and unexpected weight change (weight loss with decreased appetite)  Negative for fever  HENT:   Negative for lump/mass  Eyes: Negative for icterus  Respiratory: Negative for cough (improved/resolved), shortness of breath and wheezing  Cardiovascular: Negative for leg swelling  Gastrointestinal: Positive for constipation and nausea   Negative for abdominal pain, diarrhea and vomiting  Genitourinary: Negative for difficulty urinating and hematuria  Musculoskeletal: Negative for arthralgias, gait problem and myalgias  Skin: Negative for itching and rash  No new, changing, or concerning lesions  Neurological: Negative for extremity weakness, gait problem, headaches, light-headedness and numbness  + tremors in bilateral hands   Hematological: Negative for adenopathy  MEDICATIONS:    Current Outpatient Medications:     Aspirin Buf,CaCarb-MgCarb-MgO, 81 MG TABS, Take 81 mg by mouth, Disp: , Rfl:     benzonatate (TESSALON PERLES) 100 mg capsule, Take 100 mg by mouth 3 (three) times a day as needed for cough, Disp: , Rfl:     betamethasone dipropionate (DIPROSONE) 0 05 % cream, Apply topically 2 (two) times a day, Disp: , Rfl:     gemfibrozil (LOPID) 600 mg tablet, Take 600 mg by mouth, Disp: , Rfl:     lamoTRIgine (LaMICtal) 100 mg tablet, Take 50 mg by mouth Currently tapering off of this medication  , Disp: , Rfl:     levothyroxine 125 mcg tablet, Take 1 tablet (125 mcg total) by mouth daily, Disp: 30 tablet, Rfl: 5    pantoprazole (PROTONIX) 40 mg tablet, Take 40 mg by mouth daily, Disp: , Rfl:     predniSONE 20 mg tablet, Take 4 tablets (80 mg total) by mouth 2 (two) times a day with meals (Patient taking differently: Take 60 mg by mouth 2 (two) times a day with meals ), Disp: 112 tablet, Rfl: 0    simvastatin (ZOCOR) 40 mg tablet, Take 40 mg by mouth, Disp: , Rfl:     SUMAtriptan (IMITREX) 50 mg tablet, Take 50 mg by mouth, Disp: , Rfl:     levothyroxine 50 mcg tablet, Take 100 mcg by mouth daily, Disp: , Rfl:     ondansetron (ZOFRAN) 4 mg tablet, Take 1 tablet (4 mg total) by mouth every 8 (eight) hours as needed for nausea or vomiting for up to 15 days, Disp: 20 tablet, Rfl: 5     ALLERGIES:  Allergies   Allergen Reactions    Chocolate - Food Allergy Other (See Comments)    Iodinated Diagnostic Agents Hives     CT contrast dye      Penicillins Hives    Sulfa Antibiotics Hives    Banana - Food Allergy Headache        ACTIVE PROBLEMS:  Patient Active Problem List   Diagnosis    Malignant melanoma of scalp ([place] [address] )    Acquired hypothyroidism    Coronary arteriosclerosis    RBBB    Sarcoidosis    Seizures ([place] [address] )    Adrenal insufficiency due to cancer therapy ([place] [address] )          PAST MEDICAL HISTORY:   Past Medical History:   Diagnosis Date    Migraines     Prostate cancer ([place] [address] )     Sarcoidosis     Skin cancer         PAST SURGICAL HISTORY:  Past Surgical History:   Procedure Laterality Date    HERNIA REPAIR      PROSTATE SURGERY          SOCIAL HISTORY:  Social History     Socioeconomic History    Marital status: /Civil Union     Spouse name: None    Number of children: None    Years of education: None    Highest education level: None   Occupational History    None   Tobacco Use    Smoking status: Former Smoker    Smokeless tobacco: Never Used   Vaping Use    Vaping Use: Never used   Substance and Sexual Activity    Alcohol use: Not Currently    Drug use: Never    Sexual activity: None   Other Topics Concern    None   Social History Narrative    None     Social Determinants of Health     Financial Resource Strain:     Difficulty of Paying Living Expenses:    Food Insecurity:     Worried About Running Out of Food in the Last Year:     Ran Out of Food in the Last Year:    Transportation Needs:     Lack of Transportation (Medical):      Lack of Transportation (Non-Medical):    Physical Activity:     Days of Exercise per Week:     Minutes of Exercise per Session:    Stress:     Feeling of Stress :    Social Connections:     Frequency of Communication with Friends and Family:     Frequency of Social Gatherings with Friends and Family:     Attends [employer] Services:     Active Member of Clubs or Organizations:     Attends Club or Organization Meetings:     Marital Status:    Intimate Partner Violence:     Fear of Current or Ex-Partner:     Emotionally Abused:     Physically Abused:     Sexually Abused:         FAMILY HISTORY:  History reviewed  No pertinent family history  Objective    Objective    PHYSICAL EXAMINATION:   Blood pressure 114/72, pulse 94, temperature 98 °F (36 7 °C), temperature source Temporal, resp  rate 16, height 5' 9 1" (1 755 m), weight 71 2 kg (157 lb), SpO2 98 %  Physical Exam  Constitutional:       General: He is not in acute distress  Appearance: Normal appearance  He is not toxic-appearing  HENT:      Mouth/Throat:      Mouth: Mucous membranes are moist       Pharynx: Oropharynx is clear  Eyes:      General: No scleral icterus  Extraocular Movements: Extraocular movements intact  Conjunctiva/sclera: Conjunctivae normal    Cardiovascular:      Rate and Rhythm: Normal rate and regular rhythm  Pulses: Normal pulses  Heart sounds: No murmur heard  No friction rub  No gallop  Pulmonary:      Effort: Pulmonary effort is normal  No respiratory distress  Breath sounds: Normal breath sounds  No wheezing or rales  Abdominal:      General: There is no distension  Palpations: There is no mass  Tenderness: There is no abdominal tenderness  There is no rebound  Musculoskeletal:         General: No swelling or tenderness  Right lower leg: Edema (minimal/trace) present  Left lower leg: No edema  Lymphadenopathy:      Head:      Right side of head: No submandibular, preauricular or posterior auricular adenopathy  Left side of head: No submandibular, preauricular or posterior auricular adenopathy  Cervical: No cervical adenopathy  Right cervical: No superficial or posterior cervical adenopathy  Left cervical: No superficial or posterior cervical adenopathy  Upper Body:      Right upper body: No supraclavicular or axillary adenopathy  Left upper body: No supraclavicular or axillary adenopathy        Lower Body: No right inguinal adenopathy  No left inguinal adenopathy  Skin:     Findings: No rash  Comments: Well healed surgical scar  No evidence of recurrence at primary site  Neurological:      Mental Status: He is alert and oriented to person, place, and time  Cranial Nerves: No cranial nerve deficit  Coordination: Coordination abnormal (difficulty with finger from his nose to my finger, R worse than L; + tremor, but not intention tremor - constant)  Psychiatric:         Mood and Affect: Mood normal          Behavior: Behavior normal          Thought Content: Thought content normal          Judgment: Judgment normal          I reviewed lab data in the chart      White Blood Cell Count   Date Value Ref Range Status   06/02/2021 5 8 3 4 - 10 8 x10E3/uL Final   04/28/2021 5 7 3 4 - 10 8 x10E3/uL Final   04/08/2021 5 0 3 4 - 10 8 x10E3/uL Final     Hemoglobin   Date Value Ref Range Status   06/02/2021 15 4 13 0 - 17 7 g/dL Final   04/28/2021 14 4 13 0 - 17 7 g/dL Final   04/08/2021 13 1 13 0 - 17 7 g/dL Final     Platelet Count   Date Value Ref Range Status   06/02/2021 364 150 - 450 x10E3/uL Final   04/28/2021 251 150 - 450 x10E3/uL Final   04/08/2021 205 150 - 450 x10E3/uL Final     MCV   Date Value Ref Range Status   06/02/2021 86 79 - 97 fL Final   04/28/2021 85 79 - 97 fL Final   04/08/2021 85 79 - 97 fL Final      Potassium   Date Value Ref Range Status   06/02/2021 4 8 3 5 - 5 2 mmol/L Final   04/28/2021 4 6 3 5 - 5 2 mmol/L Final   04/08/2021 4 3 3 5 - 5 2 mmol/L Final     Chloride   Date Value Ref Range Status   06/02/2021 91 (L) 96 - 106 mmol/L Final   04/28/2021 97 96 - 106 mmol/L Final   04/08/2021 100 96 - 106 mmol/L Final     CO2   Date Value Ref Range Status   06/02/2021 19 (L) 20 - 29 mmol/L Final   04/28/2021 23 20 - 29 mmol/L Final   04/08/2021 23 20 - 29 mmol/L Final     BUN   Date Value Ref Range Status   06/02/2021 21 8 - 27 mg/dL Final   04/28/2021 19 8 - 27 mg/dL Final   04/08/2021 15 8 - 27 mg/dL Final     Creatinine   Date Value Ref Range Status   06/02/2021 1 22 0 76 - 1 27 mg/dL Final   04/28/2021 1 22 0 76 - 1 27 mg/dL Final   04/08/2021 1 08 0 76 - 1 27 mg/dL Final     Glucose, Random   Date Value Ref Range Status   06/02/2021 154 (H) 65 - 99 mg/dL Final   04/28/2021 104 (H) 65 - 99 mg/dL Final   04/08/2021 100 (H) 65 - 99 mg/dL Final     eGFR    Date Value Ref Range Status   06/02/2021 68 >59 mL/min/1 73 Final     Comment:     **[facility] currently reports eGFR in compliance with the current**    recommendations of the [employer]  [facility] will    update reporting as new guidelines are published from the NKF-ASN    Task force  04/28/2021 68 >59 mL/min/1 73 Final     Comment:     **[facility] currently reports eGFR in compliance with the current**    recommendations of the [employer]  [facility] will  update   reporting as new guidelines are published from the NKF-ASN  Task force       04/08/2021 78 >59 mL/min/1 73 Final     Albumin   Date Value Ref Range Status   06/02/2021 4 2 3 7 - 4 7 g/dL Final   04/28/2021 4 2 3 7 - 4 7 g/dL Final   04/08/2021 3 8 3 7 - 4 7 g/dL Final     TOTAL BILIRUBIN   Date Value Ref Range Status   06/02/2021 0 5 0 0 - 1 2 mg/dL Final   04/28/2021 0 5 0 0 - 1 2 mg/dL Final   04/08/2021 0 4 0 0 - 1 2 mg/dL Final     AST   Date Value Ref Range Status   06/02/2021 12 0 - 40 IU/L Final   04/28/2021 17 0 - 40 IU/L Final   04/08/2021 16 0 - 40 IU/L Final     ALT   Date Value Ref Range Status   06/02/2021 10 0 - 44 IU/L Final   04/28/2021 13 0 - 44 IU/L Final   04/08/2021 12 0 - 44 IU/L Final      LDH   Date Value Ref Range Status   06/02/2021 119 (L) 121 - 224 IU/L Final   04/28/2021 141 121 - 224 IU/L Final   04/08/2021 172 121 - 224 IU/L Final     TSH   Date Value Ref Range Status   06/02/2021 0 272 (L) 0 450 - 4 500 uIU/mL Final   04/28/2021 11 300 (H) 0 450 - 4 500 uIU/mL Final   04/08/2021 7 580 (H) 0 450 - 4 500 uIU/mL Final No results found for: C4PCJMO   No results found for: FREET4      RECENT IMAGIN2021 Brain MRI  several FLAIR hyperintensities in the periventricular and subcortical white matter, and T2/FLAIR hyperintensities seen within the leana  Included in the differential diagnosis is mild small-vessel ischemic disease  However because of the large amount of T2 FLAIR hyperintensity within the leana, included in the differential diagnosis central pontine myelinolysis, as well as a demyelinating process, and inflammatory process including Infectious etiology, and a metabolic process  Assessment    Assessment/Plan    Mr Lorenzo Carter is a 66 yr male with Stage IIIC melanoma on treatment with pembrolizumab now with newly diagnosed adrenal insufficiency with tremors and concerning findings on brain MRI    Acquired hypothyroidism  Increased his levothyroxine dose at his last visit  Fatigue has improved, but I suspect this is a combination of increased levothyroxine dose as well as steroids for his recently diagnosed adrenal insufficiency  TSH is just below the lower limit of normal, and we will continue to monitor over time  If we need to adjust the levothyroxine further we will  If needed we will also refer him to an endocrinologist to help manage his hypothyroidism and new adrenal insufficiency  Sarcoidosis  Appears to be stable  Recent imaging demonstrates some underlying lung issues that have been seen on previous scans  Hard to compare across scans as one was PET and one was a CT scan  Will continue to monitor as well as consult with his sarcoidosis specialist as needed  Of note, his chronic dry cough has resolved since been on steroids  Adrenal insufficiency due to cancer therapy [facility])  Diagnosis since his last visit here  He had called with issues have having profound fatigue  A m   cortisol and ACTH were below lower limits of normal and his sodium was low, all consistent with the diagnosis of adrenal insufficiency  He was treated with high-dose steroids and improved  We are slowly tapering down his prednisone  Will continue to assess his fatigue as we decrease his prednisone dose  Plan to switch over to hydrocortisone 1 we get down to prednisone 10 mg daily  If needed, we can have him see an endocrinologist to help manage this as well as his hypothyroidism  He is currently on 60 mg of prednisone daily  We discussed that we will decrease his dose by 10 mg weekly  He will drop to 50 mg of prednisone on Monday June 21, 2021  I counseled him on getting a medical alert bracelet or necklace designating that he has adrenal insufficiency  Malignant melanoma of scalp ([facility] [address] )  Since his last visit, Mr devorah marsh was diagnosed with adrenal insufficiency  He had called in with worsening and profound fatigue not being able to get out of bed, short of breath, and just overall not feeling well  CT chest ruled out pneumonitis, echo rule out heart failure, and a m  cortisol and ACTH were low, indicative of adrenal insufficiency  He was started on high-dose steroids in the event that he did have pneumonitis while we were waiting for the CT scan results  He was then transitioned to prednisone daily 1 mg/kg and he is currently on 60 mg daily  We will continue with slow taper  He also endorsed having a new tremor in bilateral hands  He states this has gotten worse over the past couple months, and was actually improved on high-dose steroids  Brain MRI demonstrates possible mild small-vessel ischemia and differential diagnosis including central pontine myelinolysis lysis as well as a demyelinating process, and inflammatory process including Infectious etiology, and a metabolic process  We will have him follow with his neurologist, and I will call to speak to him directly about my concerns  Given the clear etiology of findings on his brain MRI, we will hold treatment today with pembrolizumab    I discussed that there is not really and concern with having to skip a dose of immunotherapy, because the T-cells are still activated in his body and fighting any signs of melanoma  We will reassess timing of administration of next dose of pembrolizumab  He is also due for repeat PET-CT at this time, given diffuse lymphadenopathy on his last scans, thought to be due to his recent COVID-19 vaccination  This repeat PET scan will confirm that this is the case  He will return in 4 weeks with scans and labs  All scripts provided to him  He and his wife know to call if there is any issues or concerns prior to his next visit  Return in about 4 weeks (around 7/15/2021)       Brena Osler, MD, PhD Colporrhaphy